# Patient Record
Sex: MALE | Race: BLACK OR AFRICAN AMERICAN | NOT HISPANIC OR LATINO | Employment: FULL TIME | URBAN - METROPOLITAN AREA
[De-identification: names, ages, dates, MRNs, and addresses within clinical notes are randomized per-mention and may not be internally consistent; named-entity substitution may affect disease eponyms.]

---

## 2017-01-12 ENCOUNTER — TELEPHONE (OUTPATIENT)
Dept: GASTROENTEROLOGY | Facility: CLINIC | Age: 52
End: 2017-01-12

## 2017-01-12 NOTE — TELEPHONE ENCOUNTER
Per office visit patient needs to have Colonoscopy , pt stated to give his wife a call have left several message for her to give the office a call in regards to scheduling her  procedure.

## 2017-03-21 ENCOUNTER — TELEPHONE (OUTPATIENT)
Dept: GASTROENTEROLOGY | Facility: CLINIC | Age: 52
End: 2017-03-21

## 2017-03-21 NOTE — TELEPHONE ENCOUNTER
Patient stopped by the office and was ready to scheduled Colonoscopy, pt was seen in the office on 12/13/2016 by . Patient is scheduled for Colonoscopy on 4/7/17 at [facility] information was given and explained.

## 2017-03-30 ENCOUNTER — TELEPHONE (OUTPATIENT)
Dept: GASTROENTEROLOGY | Facility: CLINIC | Age: 52
End: 2017-03-30

## 2017-03-30 DIAGNOSIS — Z86.010 HISTORY OF ADENOMATOUS POLYP OF COLON: Primary | ICD-10-CM

## 2017-03-30 NOTE — TELEPHONE ENCOUNTER
Patient was rescheduled for Colonoscopy at [facility] with  on 4/10/17 per pt's request. Old records from last Colonoscopy with  was scan in under media.

## 2017-04-10 ENCOUNTER — HOSPITAL ENCOUNTER (OUTPATIENT)
Facility: HOSPITAL | Age: 52
Discharge: HOME | End: 2017-04-10
Attending: INTERNAL MEDICINE | Admitting: INTERNAL MEDICINE
Payer: COMMERCIAL

## 2017-04-10 ENCOUNTER — SURGERY (OUTPATIENT)
Age: 52
End: 2017-04-10

## 2017-04-10 ENCOUNTER — ANESTHESIA (OUTPATIENT)
Dept: ENDOSCOPY | Facility: HOSPITAL | Age: 52
End: 2017-04-10
Payer: COMMERCIAL

## 2017-04-10 ENCOUNTER — ANESTHESIA EVENT (OUTPATIENT)
Dept: ENDOSCOPY | Facility: HOSPITAL | Age: 52
End: 2017-04-10
Payer: COMMERCIAL

## 2017-04-10 DIAGNOSIS — Z86.010 HISTORY OF ADENOMATOUS POLYP OF COLON: Primary | ICD-10-CM

## 2017-04-10 DIAGNOSIS — D12.2 ADENOMATOUS POLYP OF ASCENDING COLON: ICD-10-CM

## 2017-04-10 PROBLEM — Z86.0101 HISTORY OF ADENOMATOUS POLYP OF COLON: Status: ACTIVE | Noted: 2017-04-10

## 2017-04-10 PROCEDURE — 45385 COLONOSCOPY W/LESION REMOVAL: CPT | Performed by: INTERNAL MEDICINE

## 2017-04-10 PROCEDURE — 63600175 PHARM REV CODE 636 W HCPCS: Performed by: NURSE ANESTHETIST, CERTIFIED REGISTERED

## 2017-04-10 PROCEDURE — 37000009 HC ANESTHESIA EA ADD 15 MINS: Performed by: INTERNAL MEDICINE

## 2017-04-10 PROCEDURE — 25000003 PHARM REV CODE 250: Performed by: INTERNAL MEDICINE

## 2017-04-10 PROCEDURE — 88305 TISSUE EXAM BY PATHOLOGIST: CPT | Performed by: PATHOLOGY

## 2017-04-10 PROCEDURE — 25000003 PHARM REV CODE 250: Performed by: NURSE ANESTHETIST, CERTIFIED REGISTERED

## 2017-04-10 PROCEDURE — 27201028 HC NEEDLE, SCLERO: Performed by: INTERNAL MEDICINE

## 2017-04-10 PROCEDURE — 27200997: Performed by: INTERNAL MEDICINE

## 2017-04-10 PROCEDURE — 37000008 HC ANESTHESIA 1ST 15 MINUTES: Performed by: INTERNAL MEDICINE

## 2017-04-10 PROCEDURE — 88305 TISSUE EXAM BY PATHOLOGIST: CPT | Mod: 26,,, | Performed by: PATHOLOGY

## 2017-04-10 PROCEDURE — 45390 COLONOSCOPY W/RESECTION: CPT | Performed by: INTERNAL MEDICINE

## 2017-04-10 PROCEDURE — 45381 COLONOSCOPY SUBMUCOUS NJX: CPT | Performed by: INTERNAL MEDICINE

## 2017-04-10 PROCEDURE — 45381 COLONOSCOPY SUBMUCOUS NJX: CPT | Mod: ,,, | Performed by: INTERNAL MEDICINE

## 2017-04-10 PROCEDURE — 27201089 HC SNARE, DISP (ANY): Performed by: INTERNAL MEDICINE

## 2017-04-10 PROCEDURE — 45385 COLONOSCOPY W/LESION REMOVAL: CPT | Mod: 33,,, | Performed by: INTERNAL MEDICINE

## 2017-04-10 RX ORDER — MIDAZOLAM HYDROCHLORIDE 1 MG/ML
INJECTION, SOLUTION INTRAMUSCULAR; INTRAVENOUS
Status: DISCONTINUED | OUTPATIENT
Start: 2017-04-10 | End: 2017-04-10

## 2017-04-10 RX ORDER — SODIUM CHLORIDE 9 MG/ML
INJECTION, SOLUTION INTRAVENOUS CONTINUOUS
Status: DISCONTINUED | OUTPATIENT
Start: 2017-04-10 | End: 2017-04-10 | Stop reason: HOSPADM

## 2017-04-10 RX ORDER — PROPOFOL 10 MG/ML
VIAL (ML) INTRAVENOUS CONTINUOUS PRN
Status: DISCONTINUED | OUTPATIENT
Start: 2017-04-10 | End: 2017-04-10

## 2017-04-10 RX ORDER — LIDOCAINE HCL/PF 100 MG/5ML
SYRINGE (ML) INTRAVENOUS
Status: DISCONTINUED | OUTPATIENT
Start: 2017-04-10 | End: 2017-04-10

## 2017-04-10 RX ORDER — PROPOFOL 10 MG/ML
VIAL (ML) INTRAVENOUS
Status: DISCONTINUED | OUTPATIENT
Start: 2017-04-10 | End: 2017-04-10

## 2017-04-10 RX ADMIN — SODIUM CHLORIDE: 0.9 INJECTION, SOLUTION INTRAVENOUS at 01:04

## 2017-04-10 RX ADMIN — LIDOCAINE HYDROCHLORIDE 75 MG: 20 INJECTION, SOLUTION INTRAVENOUS at 03:04

## 2017-04-10 RX ADMIN — PROPOFOL 70 MG: 10 INJECTION, EMULSION INTRAVENOUS at 03:04

## 2017-04-10 RX ADMIN — PROPOFOL 150 MCG/KG/MIN: 10 INJECTION, EMULSION INTRAVENOUS at 03:04

## 2017-04-10 RX ADMIN — MIDAZOLAM 2 MG: 1 INJECTION INTRAMUSCULAR; INTRAVENOUS at 03:04

## 2017-04-10 RX ADMIN — SODIUM CHLORIDE: 0.9 INJECTION, SOLUTION INTRAVENOUS at 03:04

## 2017-04-10 NOTE — H&P
Short Stay Endoscopy History and Physical  [facility] [place] [facility] [place]    PCP: Lottie Figueroa MD   Referring MD: Same    Procedure - colonoscopy  ASA - per anesthesia  History of Anesthesia problems - no  Family history Anesthesia problems -  no   Plan of anesthesia - MAC    HPI:  This is a 51 y.o. male here for evaluation of : a family history (brother) of colon cancer <49yo and personal history of adenomatous polyps removed at colonoscopy of July 2011. NO new or worsening GI complaints.    Reflux - no  Dysphagia - no  Abdominal pain - no  Diarrhea - no    ROS:  Constitutional: No fevers, chills, No weight loss  CV: No chest pain  Pulm: No cough, No shortness of breath  Ophtho: No vision changes  GI: see HPI  Derm: No rash    Medical History:  has no past medical history on file.    Surgical History:  has a past surgical history that includes Colonoscopy and Appendectomy.    Family History: family history includes Colon cancer in his brother.. Otherwise no colon cancer, inflammatory bowel disease, or GI malignancies.    Social History:  reports that he has never smoked. He does not have any smokeless tobacco history on file. He reports that he drinks alcohol. He reports that he does not use illicit drugs.    Review of patient's allergies indicates:  No Known Allergies    Medications:   No prescriptions prior to admission.       Physical Exam:    Vital Signs:   Vitals:    04/10/17 1318   BP: (!) 145/92   Pulse: 84   Resp: 16   Temp: 97.6 °F (36.4 °C)       General Appearance: Well appearing in no acute distress  Eyes:    No scleral icterus  ENT: Neck supple, Lips, mucosa, and tongue normal; teeth and gums normal  Lungs: CTA anteriorly  Heart:  Regular rate, S1, S2 normal, no murmurs heard.  Abdomen: Mild to moderately obese, soft, non tender, non distended with normal bowel sounds. No hepatosplenomegaly, ascites, or mass.s  Extremities: No edema  Skin: No rash    Labs:  No results found for: CBC or  CMP    Plan:  Will proceed with planned colonoscopy. I have explained the risks and benefits of endoscopy procedures to the patient including but not limited to bleeding, perforation, infection, and death.        Joseph Savage MD, FACP, FACG, AGAF [facility]  [address], [address], [place] [address]  Phone: [phone] Fax: [phone] [address], [address], [place] [address]  Phone: [phone] Fax: [phone]

## 2017-04-10 NOTE — TRANSFER OF CARE
"Anesthesia Transfer of Care Note    Patient: Candelario Amaral    Procedure(s) Performed: Procedure(s) (LRB):  COLONOSCOPY (N/A)    Patient location: GI    Anesthesia Type: MAC    Transport from OR: Transported from OR on room air with adequate spontaneous ventilation    Post pain: adequate analgesia    Post assessment: no apparent anesthetic complications    Post vital signs: stable    Level of consciousness: awake    Nausea/Vomiting: no nausea/vomiting    Complications: none          Last vitals:   Visit Vitals    BP (!) 145/92 (BP Location: Left arm, Patient Position: Lying, BP Method: Automatic)    Pulse 84    Temp 36.4 °C (97.6 °F) (Oral)    Resp 16    Ht 5' 8" (1.727 m)    Wt 104.3 kg (230 lb)    SpO2 98%    BMI 34.97 kg/m2     "

## 2017-04-10 NOTE — PLAN OF CARE
Dc instructions reviewed with patient, verbalized understanding  Dr. Savage spoke with patient at bedside regarding procedural results

## 2017-04-10 NOTE — ANESTHESIA POSTPROCEDURE EVALUATION
"Anesthesia Post Evaluation    Patient: Candelario Amaral    Procedure(s) Performed: Procedure(s) (LRB):  COLONOSCOPY (N/A)    Final Anesthesia Type: MAC  Patient location during evaluation: GI PACU  Patient participation: Yes- Able to Participate  Level of consciousness: awake and alert  Post-procedure vital signs: reviewed and stable  Pain management: adequate  Airway patency: patent  PONV status at discharge: No PONV  Anesthetic complications: no      Cardiovascular status: blood pressure returned to baseline and hemodynamically stable  Respiratory status: unassisted, spontaneous ventilation and room air  Hydration status: euvolemic  Follow-up not needed.        Visit Vitals    BP (!) 145/92 (BP Location: Left arm, Patient Position: Lying, BP Method: Automatic)    Pulse 84    Temp 36.4 °C (97.6 °F) (Oral)    Resp 16    Ht 5' 8" (1.727 m)    Wt 104.3 kg (230 lb)    SpO2 98%    BMI 34.97 kg/m2       Pain/Baldemar Score: Pain Assessment Performed: Yes (4/10/2017  1:21 PM)  Presence of Pain: denies (4/10/2017  1:21 PM)      "

## 2017-04-10 NOTE — DISCHARGE INSTRUCTIONS
Discharge Summary/Instructions for after Colonoscopy with Biopsy/Polypectomy    Candelario Amaral  4/10/2017  Joseph Degroot*    Restrictions on Activity:    - Do not drive car or operate machinery until the day after the procedure.  - The following day: return to full activity including work.  - For 3 days: No heavy lifting, straining or running.  - Diet: Eat and drink normally unless instructed otherwise.    Treatment for Common Side Effects:  - Mild abdominal pain and bloating or excessive gas: rest, eat lightly and use a heating pad.     Symptoms to watch for and report to your physician:  1. Severe abdominal pain.  2. Fever within 24 hours after a procedure.  3. A large amount of rectal bleeding. (A small amount of blood from the rectum is not serious, especially if hemorrhoids are present.)  4. Because air was put into your colon during the procedure, expelling large amount of air from your rectum is normal.  5. You may not have a bowel movement for 1-3 days because of the colonoscopy prep. This is normal.  6. Go directly to the emergency room if you notice any of the following:     Chills and/orfever over 101   Persistent vomiting   Severe abdominal pain, other than gas cramps   Severe chest pain   Black, tarry stools   Any bleeding - exceeding one tablespoon    If you have any questions or problems, please call your Physician:    Joseph Degroot*    Lab Results: [phone]    If a complication or emergency situation arises and you are unable to reach your Physician - GO TO THE EMERGENCY ROOM.

## 2017-04-10 NOTE — PLAN OF CARE
Patient's wife not available on phone.   Patient's wife located in surgery waiting room   Patient escorted out ambulatory without complains

## 2017-04-10 NOTE — IP AVS SNAPSHOT
[facility]  [address]  [place] [address]  Phone: [phone]           Patient Discharge Instructions   Our goal is to set you up for success. This packet includes information on your condition, medications, and your home care.  It will help you care for yourself to prevent having to return to the hospital.     Please ask your nurse if you have any questions.      There are many details to remember when preparing to leave the hospital. Here is what you will need to do:    1. Take your medicine. If you are prescribed medications, review your Medication List on the following pages. You may have new medications to  at the pharmacy and others that you'll need to stop taking. Review the instructions for how and when to take your medications. Talk with your doctor or nurses if you are unsure of what to do.     2. Go to your follow-up appointments. Specific follow-up information is listed in the following pages. Your may be contacted by a nurse or clinical provider about future appointments. Be sure we have all of the phone numbers to reach you. Please contact your provider's office if you are unable to make an appointment.     3. Watch for warning signs. Your doctor or nurse will give you detailed warning signs to watch for and when to call for assistance. These instructions may also include educational information about your condition. If you experience any of warning signs to your health, call your doctor.               ** Verify the list of medication(s) below is accurate and up to date. Carry this with you in case of emergency. If your medications have changed, please notify your healthcare provider.             Medication List      Notice     You have not been prescribed any medications.               Please bring to all follow up appointments:    1. A copy of your discharge instructions.  2. All medicines you are currently taking in their original bottles.  3. Identification and  insurance card.    Please arrive 15 minutes ahead of scheduled appointment time.    Please call 24 hours in advance if you must reschedule your appointment and/or time.        Follow-up Information     Follow up with Lottie Figueroa MD.    Specialty:  Family Medicine    Why:  As needed    Contact information:    [address] [place] [address] [phone]          Follow up with Joseph Savage MD.    Specialty:  Gastroenterology    Why:  As needed, Office will call with biopsy / pathology report    Contact information:    [address]  [address]  [place] [address] [phone]          Discharge Instructions     Future Orders    Diet general     Questions:    Total calories:      Fat restriction, if any:      Protein restriction, if any:      Na restriction, if any:      Fluid restriction:      Additional restrictions:          Discharge Instructions       Discharge Summary/Instructions for after Colonoscopy with Biopsy/Polypectomy    Candelario Amaral  4/10/2017  Joseph Degroot*    Restrictions on Activity:    - Do not drive car or operate machinery until the day after the procedure.  - The following day: return to full activity including work.  - For 3 days: No heavy lifting, straining or running.  - Diet: Eat and drink normally unless instructed otherwise.    Treatment for Common Side Effects:  - Mild abdominal pain and bloating or excessive gas: rest, eat lightly and use a heating pad.     Symptoms to watch for and report to your physician:  1. Severe abdominal pain.  2. Fever within 24 hours after a procedure.  3. A large amount of rectal bleeding. (A small amount of blood from the rectum is not serious, especially if hemorrhoids are present.)  4. Because air was put into your colon during the procedure, expelling large amount of air from your rectum is normal.  5. You may not have a bowel movement for 1-3 days because of the colonoscopy prep. This is normal.  6. Go  "directly to the emergency room if you notice any of the following:     Chills and/orfever over 101   Persistent vomiting   Severe abdominal pain, other than gas cramps   Severe chest pain   Black, tarry stools   Any bleeding - exceeding one tablespoon    If you have any questions or problems, please call your Physician:    Joseph Degroot*    Lab Results: [phone]    If a complication or emergency situation arises and you are unable to reach your Physician - GO TO THE EMERGENCY ROOM.          Primary Diagnosis     Your primary diagnosis was:  History Of Adenomatous Polyp Of Colon      Admission Information     Date & Time Provider Department CSN    4/10/2017 12:50 PM Joseph Savage MD [facility] [id number]      Care Providers     Provider Role Specialty Primary office phone    Joseph Savage MD Attending Provider Gastroenterology [phone]    Joseph Savage MD Surgeon  Gastroenterology [phone]      Your Vitals Were     BP Pulse Temp Resp Height Weight    118/82 (BP Location: Right arm, Patient Position: Lying) 72 97.6 °F (36.4 °C) (Oral) 16 5' 8" (1.727 m) 104.3 kg (230 lb)    SpO2 BMI             99% 34.97 kg/m2         Recent Lab Values     No lab values to display.      Pending Labs     Order Current Status    Specimen to Pathology - Surgery Collected (04/10/17 1613)      Allergies as of 4/10/2017     No Known Allergies      [facility]     [facility] Nurse Care Line - 24/7 Assistance  Unless otherwise directed by your provider, please contact [facility], our nurse care line that is available for 24/7 assistance.     Registered nurses in the [facility] provide clinical advisement, health education, appointment booking, and other advisory services.  Call for this free service at [phone].        Advance Directives     An advance directive is a document which, in the event you are no longer able to make " decisions for yourself, tells your healthcare team what kind of treatment you do or do not want to receive, or who you would like to make those decisions for you.  If you do not currently have an advance directive, [facility] encourages you to create one.  For more information call:  [phone]), [phone] ([phone]),  or log on to [email or web address].        Language Assistance Services     ATTENTION: Language assistance services are available, free of charge. Please call [phone].      ATENCIÓN: Si habla español, tiene a moran disposición servicios gratuitos de asistencia lingüística. Llame al [phone].     CHÚ Ý: N?u b?n nói Ti?ng Vi?t, có các d?ch v? h? tr? ngôn ng? mi?n phí dành cho b?n. G?i s? [phone].        [facility] Sign-Up     Activating your [facility] account is as easy as 1-2-3!     1) Visit [email or web address], select Sign Up Now, enter this activation code and your date of birth, then select Next.  [email or web address]  Expires: 5/25/2017  4:25 PM      2) Create a username and password to use when you visit [facility] in the future and select a security question in case you lose your password and select Next.    3) Enter your e-mail address and click Sign Up!    Additional Information  If you have questions, please e-mail [email or web address] or call [phone] to talk to our [facility] staff. Remember, [facility] is NOT to be used for urgent needs. For medical emergencies, dial 911.          [facility] complies with applicable Federal civil rights laws and does not discriminate on the basis of race, color, national origin, age, disability, or sex.

## 2017-04-10 NOTE — ANESTHESIA PREPROCEDURE EVALUATION
04/10/2017  Candelario Amaral is a 51 y.o., male for colonoscopy    Review of patient's allergies indicates:  No Known Allergies    No past medical history on file.    Past Surgical History:   Procedure Laterality Date    COLONOSCOPY       There is no problem list on file for this patient.        [facility] Anesthesia Evaluation     I have reviewed the Nursing Notes.   I have reviewed the Medications.     Review of Systems  Hepatic/GI:   Bowel Prep.      Wt Readings from Last 3 Encounters:   12/13/16 107.5 kg (237 lb)     Temp Readings from Last 3 Encounters:   No data found for Temp     BP Readings from Last 3 Encounters:   12/13/16 (!) 140/97     Pulse Readings from Last 3 Encounters:   12/13/16 81     No results found for: WBC, HGB, HCT, MCV, PLT      Physical Exam  General:  Well nourished    Airway/Jaw/Neck:  Airway Findings: Mouth Opening: Normal Tongue: Normal  General Airway Assessment: Adult  Mallampati: II  Improves to II with phonation.  TM Distance: Normal, at least 6 cm       Chest/Lungs:  Chest/Lungs Clear    Heart/Vascular:  Heart Findings: Normal       Mental Status:  Mental Status Findings:  Cooperative         Anesthesia Plan  Type of Anesthesia, risks & benefits discussed:  Anesthesia Type:  MAC  Patient's Preference:   Intra-op Monitoring Plan:   Intra-op Monitoring Plan Comments:   Post Op Pain Control Plan:   Post Op Pain Control Plan Comments:   Induction:   IV  Beta Blocker:         Informed Consent: Patient understands risks and agrees with Anesthesia plan.  Questions answered. Anesthesia consent signed with patient.  ASA Score: 1     Day of Surgery Review of History & Physical: I have interviewed and examined the patient. I have reviewed the patient's H&P dated:  There are no significant changes.   H&P completed by Anesthesiologist.       Ready For Surgery From Anesthesia Perspective.

## 2017-04-12 VITALS
WEIGHT: 230 LBS | TEMPERATURE: 98 F | HEIGHT: 68 IN | RESPIRATION RATE: 17 BRPM | DIASTOLIC BLOOD PRESSURE: 85 MMHG | BODY MASS INDEX: 34.86 KG/M2 | OXYGEN SATURATION: 100 % | SYSTOLIC BLOOD PRESSURE: 155 MMHG | HEART RATE: 65 BPM

## 2017-04-18 ENCOUNTER — TELEPHONE (OUTPATIENT)
Dept: GASTROENTEROLOGY | Facility: CLINIC | Age: 52
End: 2017-04-18

## 2017-04-19 ENCOUNTER — TELEPHONE (OUTPATIENT)
Dept: GASTROENTEROLOGY | Facility: CLINIC | Age: 52
End: 2017-04-19

## 2017-04-19 NOTE — TELEPHONE ENCOUNTER
----- Message from Joseph Savage MD sent at 4/18/2017  2:02 PM CDT -----  Review of pathology report from colonoscopy dated 10 April 17:  SPECIMEN  1) Polyp at 35 cm by hot snare.  2) Cecal polyp by hot snare.  FINAL PATHOLOGIC DIAGNOSIS  1. 35 cm polyp:  -Benign polyploid colonic mucosa  -Small lymphoid aggregate  -Negative for dysplasia or malignancy  2. Cecal polyp:  -Adenomatous polyp  -See note  Note: 2. Cross sections of adenoma are represented. High-grade dysplasia is not identified.    IMP:- benign colon polyps removed    REC:- F/U colonoscopy 3yr    PCP: MD Joseph Brock MD, FACP, FACG, AGAF [facility]  [address], [address], [place] [address]  Phone: [phone] Fax: [phone]    [address], [address], [place] [address]  Phone: [phone] Fax: [phone]    - Portions of this note were dictated using voice recognition software and may contain dictation related errors in spelling / grammar / syntax not discovered on text review.

## 2017-11-18 ENCOUNTER — HOSPITAL ENCOUNTER (EMERGENCY)
Facility: HOSPITAL | Age: 52
Discharge: HOME | End: 2017-11-18
Attending: FAMILY MEDICINE
Payer: OTHER MISCELLANEOUS

## 2017-11-18 VITALS
RESPIRATION RATE: 97 BRPM | DIASTOLIC BLOOD PRESSURE: 82 MMHG | HEART RATE: 60 BPM | WEIGHT: 245 LBS | SYSTOLIC BLOOD PRESSURE: 118 MMHG | BODY MASS INDEX: 37.13 KG/M2 | OXYGEN SATURATION: 98 % | HEIGHT: 68 IN | TEMPERATURE: 97 F

## 2017-11-18 DIAGNOSIS — M54.2 NECK PAIN: Primary | ICD-10-CM

## 2017-11-18 PROCEDURE — 96372 THER/PROPH/DIAG INJ SC/IM: CPT

## 2017-11-18 PROCEDURE — 99283 EMERGENCY DEPT VISIT LOW MDM: CPT | Mod: 25

## 2017-11-18 PROCEDURE — 63600175 PHARM REV CODE 636 W HCPCS: Performed by: FAMILY MEDICINE

## 2017-11-18 RX ORDER — DICLOFENAC SODIUM 50 MG/1
50 TABLET, DELAYED RELEASE ORAL 2 TIMES DAILY
Qty: 21 TABLET | Refills: 0 | Status: SHIPPED | OUTPATIENT
Start: 2017-11-18

## 2017-11-18 RX ORDER — KETOROLAC TROMETHAMINE 30 MG/ML
60 INJECTION, SOLUTION INTRAMUSCULAR; INTRAVENOUS
Status: COMPLETED | OUTPATIENT
Start: 2017-11-18 | End: 2017-11-18

## 2017-11-18 RX ORDER — CYCLOBENZAPRINE HCL 10 MG
10 TABLET ORAL 3 TIMES DAILY PRN
Qty: 15 TABLET | Refills: 0 | Status: SHIPPED | OUTPATIENT
Start: 2017-11-18 | End: 2017-11-23

## 2017-11-18 RX ADMIN — KETOROLAC TROMETHAMINE 60 MG: 60 INJECTION, SOLUTION INTRAMUSCULAR at 11:11
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
## 2021-03-04 ENCOUNTER — IMMUNIZATION (OUTPATIENT)
Dept: FAMILY MEDICINE | Facility: CLINIC | Age: 56
End: 2021-03-04
Payer: COMMERCIAL

## 2021-03-04 DIAGNOSIS — Z23 NEED FOR VACCINATION: Primary | ICD-10-CM

## 2021-03-04 PROCEDURE — 91300 COVID-19, MRNA, LNP-S, PF, 30 MCG/0.3 ML DOSE VACCINE: CPT | Mod: PBBFAC | Performed by: FAMILY MEDICINE

## 2021-03-31 ENCOUNTER — IMMUNIZATION (OUTPATIENT)
Dept: FAMILY MEDICINE | Facility: CLINIC | Age: 56
End: 2021-03-31
Payer: COMMERCIAL

## 2021-03-31 DIAGNOSIS — Z23 NEED FOR VACCINATION: Primary | ICD-10-CM

## 2021-03-31 PROCEDURE — 0002A COVID-19, MRNA, LNP-S, PF, 30 MCG/0.3 ML DOSE VACCINE: CPT | Mod: PBBFAC | Performed by: FAMILY MEDICINE

## 2021-03-31 PROCEDURE — 91300 COVID-19, MRNA, LNP-S, PF, 30 MCG/0.3 ML DOSE VACCINE: CPT | Mod: PBBFAC | Performed by: FAMILY MEDICINE

## 2022-08-18 ENCOUNTER — IMMUNIZATION (OUTPATIENT)
Dept: INTERNAL MEDICINE | Facility: CLINIC | Age: 57
End: 2022-08-18
Payer: COMMERCIAL

## 2022-08-18 DIAGNOSIS — Z23 NEED FOR VACCINATION: Primary | ICD-10-CM

## 2022-08-18 PROCEDURE — 0051A COVID-19, MRNA, LNP-S, PF, 30 MCG/0.3 ML DOSE VACCINE (PFIZER): CPT | Mod: PBBFAC | Performed by: FAMILY MEDICINE

## 2022-08-18 PROCEDURE — 91305 COVID-19, MRNA, LNP-S, PF, 30 MCG/0.3 ML DOSE VACCINE (PFIZER): CPT | Mod: PBBFAC | Performed by: FAMILY MEDICINE

## 2023-11-29 ENCOUNTER — HOSPITAL ENCOUNTER (EMERGENCY)
Facility: HOSPITAL | Age: 58
Discharge: HOME | End: 2023-11-29
Attending: STUDENT IN AN ORGANIZED HEALTH CARE EDUCATION/TRAINING PROGRAM
Payer: COMMERCIAL

## 2023-11-29 VITALS
RESPIRATION RATE: 20 BRPM | HEART RATE: 99 BPM | WEIGHT: 225 LBS | DIASTOLIC BLOOD PRESSURE: 100 MMHG | SYSTOLIC BLOOD PRESSURE: 182 MMHG | OXYGEN SATURATION: 95 % | BODY MASS INDEX: 33.33 KG/M2 | TEMPERATURE: 99 F | HEIGHT: 69 IN

## 2023-11-29 DIAGNOSIS — J10.1 INFLUENZA A: Primary | ICD-10-CM

## 2023-11-29 LAB
GROUP A STREP, MOLECULAR: NEGATIVE
INFLUENZA A, MOLECULAR: POSITIVE
INFLUENZA B, MOLECULAR: NEGATIVE
SARS-COV-2 RDRP RESP QL NAA+PROBE: NEGATIVE
SPECIMEN SOURCE: ABNORMAL

## 2023-11-29 PROCEDURE — 87502 INFLUENZA DNA AMP PROBE: CPT | Mod: ER

## 2023-11-29 PROCEDURE — 99282 EMERGENCY DEPT VISIT SF MDM: CPT | Mod: ER

## 2023-11-29 PROCEDURE — U0002 COVID-19 LAB TEST NON-CDC: HCPCS | Mod: ER

## 2023-11-29 PROCEDURE — 87651 STREP A DNA AMP PROBE: CPT | Mod: ER

## 2023-11-29 NOTE — Clinical Note
"Candelario Samuelsan" [place] was seen and treated in our emergency department on 11/29/2023.  He may return to work on 12/11/2023.       If you have any questions or concerns, please don't hesitate to call.      Katie Allen PA-C"

## 2023-11-29 NOTE — DISCHARGE INSTRUCTIONS
Below are suggestions for symptomatic relief:              - Tylenol every 4 hours OR ibuprofen every 6 hours as needed for pain/fever.              - Salt water gargles to soothe throat pain.              - Chloroseptic spray also helps to numb throat pain.              - Nasal saline spray reduces inflammation and dryness.              - Warm face compresses to help with facial sinus pain/pressure.              - Jeff's vapor rub at night.              - Flonase OTC or Nasocort OTC for nasal congestion.              - Simple foods like bread, rice, soup.              - Delsym helps with coughing at night              - Zyrtec/Claritin during the day & Benadryl at night may help with allergies.     If you DO HAVE hypertension or palpitations, it is safe to take Coricidin HBP for relief of sinus symptoms.     If you DO NOT HAVE hypertension or any history of palpitations, it is ok to take over the counter Sudafed or Mucinex D or Allegra-D or Claritin-D or Zyrtec-D.  If you do take one of the above, it is ok to combine that with plain over the counter Mucinex or Allegra or Claritin or Zyrtec.   If, for example, you are taking Zyrtec-D, you can combine that with Mucinex, but not Mucinex-D.  If you are taking Mucinex-D, you can combine that with plain Allegra or Claritin or Zyrtec.      Follow up with your primary care provider within 2-5 days if your symptoms have not improved.

## 2023-11-29 NOTE — ED PROVIDER NOTES
Encounter Date: 11/29/2023       History     Chief Complaint   Patient presents with    COVID-19 Concerns     Nasal congestion, sore throat, cough and body aches for the past 2 days.   Patient reports he received a flu vaccine injection 1 hour ago at [facility].      57 yo male presents to the ED with cough, nasal congestion, body aches. Symptoms started three days ago.  Denies any chest pain, shortness of breath, abdominal pain, nausea, vomiting, diarrhea.  He has been taking an over-the-counter cough syrup combination medication, a.m. and p.m..  This does help his symptoms.  He has no diagnosed medical conditions and takes no daily medications.  Never been diagnosed with high blood pressure before, encouraged to follow up with PCP. He denies headache, vision changes, paresthesias, slurred speech, extremity weakness.     The history is provided by the patient.     Review of patient's allergies indicates:  No Known Allergies  No past medical history on file.  Past Surgical History:   Procedure Laterality Date    APPENDECTOMY      COLONOSCOPY      COLONOSCOPY N/A 4/10/2017    Procedure: COLONOSCOPY;  Surgeon: Joseph Savage MD;  Location: [facility];  Service: Endoscopy;  Laterality: N/A;     Family History   Problem Relation Age of Onset    Colon cancer Brother      Social History     Substance Use Topics    Alcohol use: Yes    Drug use: No     Review of Systems   Constitutional:  Positive for chills, fatigue and fever.   HENT:  Positive for congestion and sore throat. Negative for facial swelling.    Respiratory:  Positive for cough. Negative for shortness of breath.    Cardiovascular:  Negative for chest pain.   Gastrointestinal:  Negative for abdominal pain, nausea and vomiting.   Neurological:  Negative for headaches.   Psychiatric/Behavioral:  Negative for agitation and confusion.        Physical Exam     Initial Vitals [11/29/23 1533]   BP Pulse Resp Temp SpO2   (!) 186/96 99 20 98.6 °F (37 °C) 96 %       MAP       --         Physical Exam    Nursing note and vitals reviewed.  Constitutional: He appears well-developed and well-nourished. He is not diaphoretic.  Non-toxic appearance. No distress.   HENT:   Head: Normocephalic and atraumatic.   Right Ear: Hearing, tympanic membrane, external ear and ear canal normal.   Left Ear: Hearing, tympanic membrane, external ear and ear canal normal.   Nose: Mucosal edema present.   Mouth/Throat: Uvula is midline. No trismus in the jaw. No uvula swelling. Posterior oropharyngeal erythema present. No oropharyngeal exudate or posterior oropharyngeal edema.   Eyes: Conjunctivae and EOM are normal. Pupils are equal, round, and reactive to light.   Neck: Neck supple.   Normal range of motion.  Cardiovascular:  Normal rate and regular rhythm.           Pulmonary/Chest: Breath sounds normal. No respiratory distress. He has no wheezes. He has no rales.   Abdominal: Abdomen is soft. He exhibits no distension. There is no abdominal tenderness.   Musculoskeletal:         General: Normal range of motion.      Cervical back: Normal range of motion and neck supple.     Neurological: He is alert and oriented to person, place, and time. GCS score is 15. GCS eye subscore is 4. GCS verbal subscore is 5. GCS motor subscore is 6.   Skin: Skin is warm and dry.   Psychiatric: He has a normal mood and affect. His behavior is normal. Judgment and thought content normal.         ED Course   Procedures  Labs Reviewed   INFLUENZA A & B BY MOLECULAR - Abnormal; Notable for the following components:       Result Value    Influenza A, Molecular Positive (*)     All other components within normal limits   GROUP A STREP, MOLECULAR   SARS-COV-2 RNA AMPLIFICATION, QUAL    Narrative:     Is the patient symptomatic?->Yes          Imaging Results    None          Medications - No data to display  Medical Decision Making  58-year-old male with cough, nasal congestion, body aches for 3 days.  He is nontoxic in  appearance, afebrile on arrival.    Viral URI, COVID, Flu, allergic rhinitis, strep throat, viral pharyngitis, alejandra foreign body, otitis media/external, nasal polyp, bacterial sinusitis,  Considered but less likely: pneumonia, sepsis, meningitis, cavernous sinus thrombosis, peritonsillar abscess, retropharyngeal abscess, epiglottitis                 ED Course as of 11/30/23 0031   Wed Nov 29, 2023   1733 Patient appears fatigued, but generally well.  Nontoxic.  No distress.  Heart and lungs clear.  Abdomen soft and nontender.  COVID, strep negative.  He is not chronically ill or immunosuppressed.  Symptom onset > 48 hours, I will not prescribe oseltamivir. Additionally discussed supportive care.  Work note provided.  Given history and exam, lower suspicion for pneumonia, sepsis, COPD, CHF, ACS, bacterial otitis media, peritonsillar abscess, retropharyngeal abscess, meningitis. Stable for discharge at this time.  ED return precautions were discussed with patient.  PCP follow up recommended. [CS]      ED Course User Index  [CS] Katie Allen PA-C                           Clinical Impression:  Final diagnoses:  [J10.1] Influenza A (Primary)          ED Disposition Condition    Discharge Stable          ED Prescriptions    None       Follow-up Information       Follow up With Specialties Details Why Contact Info    Lottie Figueroa MD Family Medicine  As needed [address] [facility]   [address]  [place] [address] [phone]               Katie Allen PA-C  11/30/23 0031

## 2024-08-15 ENCOUNTER — HOSPITAL ENCOUNTER (EMERGENCY)
Facility: HOSPITAL | Age: 59
Discharge: HOME | End: 2024-08-15
Attending: EMERGENCY MEDICINE
Payer: COMMERCIAL

## 2024-08-15 VITALS
SYSTOLIC BLOOD PRESSURE: 127 MMHG | RESPIRATION RATE: 16 BRPM | DIASTOLIC BLOOD PRESSURE: 78 MMHG | HEART RATE: 82 BPM | BODY MASS INDEX: 33.33 KG/M2 | HEIGHT: 69 IN | TEMPERATURE: 98 F | WEIGHT: 225 LBS | OXYGEN SATURATION: 96 %

## 2024-08-15 DIAGNOSIS — H02.401 PTOSIS OF RIGHT EYELID: Primary | ICD-10-CM

## 2024-08-15 PROCEDURE — 25000003 PHARM REV CODE 250

## 2024-08-15 PROCEDURE — 99284 EMERGENCY DEPT VISIT MOD MDM: CPT | Mod: 25

## 2024-08-15 PROCEDURE — 63600175 PHARM REV CODE 636 W HCPCS

## 2024-08-15 RX ORDER — PREDNISONE 20 MG/1
40 TABLET ORAL DAILY
Qty: 8 TABLET | Refills: 0 | Status: SHIPPED | OUTPATIENT
Start: 2024-08-15 | End: 2024-08-19

## 2024-08-15 RX ORDER — PROPARACAINE HYDROCHLORIDE 5 MG/ML
1 SOLUTION/ DROPS OPHTHALMIC
Status: COMPLETED | OUTPATIENT
Start: 2024-08-15 | End: 2024-08-15

## 2024-08-15 RX ORDER — PREDNISONE 20 MG/1
60 TABLET ORAL
Status: COMPLETED | OUTPATIENT
Start: 2024-08-15 | End: 2024-08-15

## 2024-08-15 RX ADMIN — PREDNISONE 60 MG: 20 TABLET ORAL at 09:08

## 2024-08-15 RX ADMIN — PROPARACAINE HYDROCHLORIDE 1 DROP: 5 SOLUTION/ DROPS OPHTHALMIC at 08:08

## 2024-08-16 DIAGNOSIS — H49.01 THIRD NERVE PALSY OF RIGHT EYE: Primary | ICD-10-CM

## 2024-08-16 NOTE — ED NOTES
Patient arrived to ED via private vehicle with complaints of R eye blurry vision and R eye droop x 1 week. Reports symptoms started after a road trip from the [facility] back to [place]. Patient reports he saw an optometrist earlier today and was instructed to come to ER for further evaluation and work up. Reports optometrist was suggesting a scan of brain.   Patient awake, alert, oriented x 4. No vision changes to L eye. Denies dizziness. Speech clear. Ambulatory steady gait. Denies dizziness. No neuromuscluar deficits. Denies pain. Breathing unlabored and even.     APPEARANCE: Alert, oriented and in no acute distress.  CARDIAC: Normal rate and rhythm, no murmur heard.   PERIPHERAL VASCULAR: peripheral pulses present. Normal cap refill. No edema. Warm to touch.    RESPIRATORY:Normal rate and effort, breath sounds clear bilaterally throughout chest. Respirations are equal and unlabored no obvious signs of distress.  GASTRO: soft, bowel sounds normal, no tenderness, no abdominal distention.  MUSC: Full ROM. No bony tenderness or soft tissue tenderness. No obvious deformity.  SKIN: Skin is warm and dry, normal skin turgor, mucous membranes moist.  NEURO: 5/5 strength major flexors/extensors bilaterally. Sensory intact to light touch bilaterally. Dillan coma scale: eyes open spontaneously-4, oriented & converses-5, obeys commands-6. No neurological abnormalities.   MENTAL STATUS: awake, alert and aware of environment.  EYE: PERRL, both eyes: pupils brisk and reactive to light. Normal size. +2. R eye droop, swelling. No drainage, no pain.   ENT: EARS: no obvious drainage. NOSE: no active bleeding.

## 2024-08-16 NOTE — ED PROVIDER NOTES
Encounter Date: 8/15/2024       History     Chief Complaint   Patient presents with    Eye Problem     Pt arrived to the ED with complaints of blurred vision and eye drooping with an onset of a week ago. Pt states he went to the eye doctor this afternoon for blurred vision and eye drooping and the doctor suggested he come to the ER. Denies any drainage or pain. Denies use of glasses and contacts.     59 year old male with past medical history of adenomatous polyp of colon presents to the ED for further evaluation of worsening eye drooping x 1 week.  Patient notes symptoms begun 3 weeks ago.  States associated intermittent blurred vision and right eye drooping.  Patient denies photophobia, eye discharge, redness, itching, new onset of rashes, facial pain or swelling.  Denies loss of vision or recent eye trauma.  No pain with ocular movements.  Denies foreign body sensation.  No history of eye surgery.  Patient states he was evaluated by an ophthalmologist today who recommended to get further evaluation in the ED.  patient does not use glasses or contacts.  No, nausea, vomiting dizziness, neuro deficits, neck pain, headache, fever, chest pain shortness of breath.  No other acute complaints today.          The history is provided by the patient.     Review of patient's allergies indicates:  No Known Allergies  History reviewed. No pertinent past medical history.  Past Surgical History:   Procedure Laterality Date    APPENDECTOMY      COLONOSCOPY      COLONOSCOPY N/A 4/10/2017    Procedure: COLONOSCOPY;  Surgeon: Joseph Savage MD;  Location: [facility];  Service: Endoscopy;  Laterality: N/A;     Family History   Problem Relation Name Age of Onset    Colon cancer Brother       Social History     Substance Use Topics    Alcohol use: Yes    Drug use: No     Review of Systems   Constitutional:  Negative for chills and fever.   HENT:  Negative for facial swelling.    Eyes:  Positive for visual disturbance.  Negative for photophobia, pain, discharge and redness.   Respiratory:  Negative for shortness of breath.    Cardiovascular:  Negative for chest pain.   Gastrointestinal:  Negative for abdominal distention, abdominal pain, nausea and vomiting.   Musculoskeletal:  Negative for neck pain and neck stiffness.   Neurological:  Negative for dizziness, facial asymmetry, weakness, numbness and headaches.       Physical Exam     Initial Vitals [08/15/24 1949]   BP Pulse Resp Temp SpO2   117/86 97 16 98.2 °F (36.8 °C) 97 %      MAP       --         Physical Exam    Vitals reviewed.  Constitutional: He appears well-developed and well-nourished. He is not diaphoretic. No distress.   Pt alert and attentive, fluent speech. In no acute distress, resting comfortably on bed.   HENT:   Head: Normocephalic and atraumatic.   Right Ear: External ear normal.   Left Ear: External ear normal.   Mouth/Throat: Oropharynx is clear and moist.   Patient is able to move all muscles of his face. Theres slight right upper eyelid drooping. able to close right eye or raise right eyebrow. No pain with EOM on both eyes. Clear conjunctiva, no discharge.     IOP:    R:19; L15   Eyes: Conjunctivae and EOM are normal. Pupils are equal, round, and reactive to light. Right eye exhibits no discharge. Left eye exhibits no discharge.   Neck: Neck supple.   Normal range of motion.  Cardiovascular:  Normal rate and normal heart sounds.           Pulmonary/Chest: Breath sounds normal. No respiratory distress. He has no wheezes.   Abdominal: Abdomen is soft. Bowel sounds are normal. He exhibits no distension. There is no abdominal tenderness.   Musculoskeletal:         General: Normal range of motion.      Cervical back: Normal range of motion and neck supple.      Comments: Moves all extremities and carries on conversation. CN- II: PERRL; III/IV/VI: EOMI w/out evidence of nystagmus; V: no deficits appreciated to light touch bilateral face; VII: no facial weakness,  no facial asymmetry. Eyebrow raise symmetric. Smile symmetric; IX/X: palate midline, and raises symmetrically; XI: shoulder shrug 5/5 bilaterally; XII: tongue is midline w/out asymmetry. Strength 5/5 to bilateral upper and lower extremities, sensation intact to light touch,         Neurological: He is alert and oriented to person, place, and time. GCS score is 15. GCS eye subscore is 4. GCS verbal subscore is 5. GCS motor subscore is 6.   Moves all extremities and carries on conversation. CN- II: PERRL; III/IV/VI: EOMI w/out evidence of nystagmus; V: no deficits appreciated to light touch bilateral face; VII: no facial weakness, no facial asymmetry. Eyebrow raise symmetric. Smile symmetric; IX/X: palate midline, and raises symmetrically; XI: shoulder shrug 5/5 bilaterally; XII: tongue is midline w/out asymmetry. Strength 5/5 to bilateral upper and lower extremities, sensation intact to light touch,       Skin: Skin is warm. Capillary refill takes less than 2 seconds.   Psychiatric: He has a normal mood and affect. His behavior is normal. Judgment and thought content normal.         ED Course   Procedures  Labs Reviewed - No data to display       Imaging Results              CT Head Without Contrast (Final result)  Result time 08/15/24 21:35:51      Final result by Brandon Burger DO (08/15/24 21:35:51)                   Impression:      No acute intracranial abnormality.      Electronically signed by: Brandon Burger  Date:    08/15/2024  Time:    21:35               Narrative:    EXAMINATION:  CT HEAD WITHOUT CONTRAST    CLINICAL HISTORY:  Vision impairment (Ped 0-18y);    TECHNIQUE:  Low dose axial CT images obtained throughout the head without intravenous contrast. Sagittal and coronal reconstructions were performed.    COMPARISON:  None available.    FINDINGS:  Ventricles and sulci are normal in size for age without evidence of hydrocephalus. No extra-axial blood or fluid collections.  The brain parenchyma is  normal. No parenchymal mass, hemorrhage, edema or major vascular distribution infarct.    No calvarial fracture.  Incidentally noted persistence of the metopic suture.  The scalp is unremarkable.  Bilateral paranasal sinuses and mastoid air cells are clear.                                       Medications   proparacaine 0.5 % ophthalmic solution 1 drop (1 drop Left Eye Given by Provider 8/15/24 2030)   predniSONE tablet 60 mg (60 mg Oral Given 8/15/24 2157)     Medical Decision Making  Differential Diagnosis includes, but is not limited to:  Acute glaucoma, open globe, ocular foreign body, retinal detachment, vitreous hemorrhage, endophthalmitis, traumatic injury, orbital fracture, corneal abrasion/ulcer, retinal vascular occlusion, optic neuritis, periorbital/orbital cellulitis, hyphema, hypopion, iritis, UV keratitis, subconjunctival hemorrhage, conjunctivitis, blepharitis, chalazion/hordeolum, benign vitreous floaters, ptosis    ED management     59 year old male with past medical history of adenomatous polyp of colon presents to the ED for further evaluation of worsening eye drooping x 1 week.  Patient is not toxic appearing, hemodynamically stable and resting comfortably on bed. Patient is well-appearing.  Awake and alert.  Afebrile with vitals WNL. No distress on exam. No neurological symptoms. Specifically, he has had no headache, dysarthria, numbness/tingling, paralysis, or gait dysfunction. He has also not had any fevers/chills, cough, chest pain, rash, or new joint aches or muscle pains. Differential diagnosis is broad and includes but not limited to Bell's palsy, CVA,  nerve palsy of right eye versus other.  History and presentation NOT consistent with trigeminal neuralgia, Botulism, MG, ICH.The patient's eyebrows raise and symmetric manner bilaterally this is somewhat inconsistent with Bell's palsy and I felt a workup was indicated. CT head without evidence of acute intracranial abnormalities. Will send  home with Rx short course of Prednisone. Advised to have close follow up with PCP. Pt stable throughout ED course.    I have discussed the specifics of the workup with the patient and the patient has verbalized understanding of the details of the workup, the diagnosis, the treatment plan, and the need for outpatient follow-up with PCP. ED precautions given. Discussed with pt about returning to the ED, if symptoms fail to improve or worsen. Care of patient also discussed with Dr. Calero who agrees with assessment and plan.    RESULTS:  Documented in ED course.   Labs/ekg interpreted by myself and Dr. Calero.       Voice recognition software utilized in this note. Typographical and content errors may occur with this process. While efforts are made to detect and correct such errors, in some cases errors will persist. For this reason, wording in this document should be considered in the proper context and not strictly verbatim.                      Amount and/or Complexity of Data Reviewed  Radiology: ordered. Decision-making details documented in ED Course.    Risk  Prescription drug management.              Attending Attestation:     Physician Attestation Statement for NP/PA:   I personally made/approved the management plan and take responsibility for the patient management.    Other NP/PA Attestation Additions:    History of Present Illness: Agree; 59-year-old male sent to the emergency department from his ophthalmologist for evaluation of right eye drooping; he reports occasional double vision or blurry vision but this improves with blinking; this seems possibly more related to his right eye lid drooping over his pupil   Physical Exam: Agree   Medical Decision Making: Agree; CT head negative; exam and consistent with any sort of acute stroke; referred to primary care physician for further evaluation and management, given prescription for steroids             ED Course as of 08/16/24 1637   Thu Aug 15, 2024   2138  CT Head Without Contrast  FINDINGS:  Ventricles and sulci are normal in size for age without evidence of hydrocephalus. No extra-axial blood or fluid collections.  The brain parenchyma is normal. No parenchymal mass, hemorrhage, edema or major vascular distribution infarct.     No calvarial fracture.  Incidentally noted persistence of the metopic suture.  The scalp is unremarkable.  Bilateral paranasal sinuses and mastoid air cells are clear.     Impression:     No acute intracranial abnormality.   [NW]      ED Course User Index  [NW] Janna Flores PA-C                           Clinical Impression:  Final diagnoses:  [H02.401] Ptosis of right eyelid (Primary)          ED Disposition Condition    Discharge Stable          ED Prescriptions       Medication Sig Dispense Start Date End Date Auth. Provider    predniSONE (DELTASONE) 20 MG tablet Take 2 tablets (40 mg total) by mouth once daily. for 4 days 8 tablet 8/15/2024 8/19/2024 Janna Flores PA-C          Follow-up Information       Follow up With Specialties Details Why Contact Info    Lottie Figueroa MD Family Medicine Schedule an appointment as soon as possible for a visit in 3 days As needed, If symptoms worsen [address] [facility] [place] [address]  [phone]               Janna Flores PA-C  08/16/24 152       Leandro Calero MD  08/16/24 7056

## 2024-08-16 NOTE — DISCHARGE INSTRUCTIONS
Mr. Amaral,     Thank you for letting me care for you today! It was nice meeting you, and I hope you feel better soon.   If you would like access to your chart and what was done today please utilize the [facility].   Please don't hesitate to return if your symptoms worsen or you develop any other worrisome symptoms.    Our goal in the emergency department is to always give you outstanding care and exceptional service. You may receive a survey by mail or e-mail in the next week regarding your experience in our ED. We would greatly appreciate you completing and returning the survey. Your feedback provides us with a way to recognize our staff who give very good care and it helps us learn how to improve when your experience was below our aspiration of excellence.     Sincerely,    Janna Flores PA-C  Emergency Department Physician Assistant  [facility], [place], and [facility]

## 2024-09-03 ENCOUNTER — LAB VISIT (OUTPATIENT)
Dept: LAB | Facility: HOSPITAL | Age: 59
End: 2024-09-03
Attending: STUDENT IN AN ORGANIZED HEALTH CARE EDUCATION/TRAINING PROGRAM
Payer: COMMERCIAL

## 2024-09-03 ENCOUNTER — OFFICE VISIT (OUTPATIENT)
Dept: NEUROLOGY | Facility: CLINIC | Age: 59
End: 2024-09-03
Payer: COMMERCIAL

## 2024-09-03 VITALS
HEART RATE: 96 BPM | WEIGHT: 222.69 LBS | DIASTOLIC BLOOD PRESSURE: 74 MMHG | BODY MASS INDEX: 32.98 KG/M2 | SYSTOLIC BLOOD PRESSURE: 110 MMHG | HEIGHT: 69 IN

## 2024-09-03 DIAGNOSIS — H49.01 THIRD NERVE PALSY OF RIGHT EYE: Primary | ICD-10-CM

## 2024-09-03 DIAGNOSIS — H49.01 THIRD NERVE PALSY OF RIGHT EYE: ICD-10-CM

## 2024-09-03 DIAGNOSIS — H53.2 DIPLOPIA: ICD-10-CM

## 2024-09-03 LAB
ALBUMIN SERPL BCP-MCNC: 4 G/DL (ref 3.5–5.2)
ALP SERPL-CCNC: 53 U/L (ref 55–135)
ALT SERPL W/O P-5'-P-CCNC: 15 U/L (ref 10–44)
ANION GAP SERPL CALC-SCNC: 7 MMOL/L (ref 8–16)
AST SERPL-CCNC: 15 U/L (ref 10–40)
BASOPHILS # BLD AUTO: 0.1 K/UL (ref 0–0.2)
BASOPHILS NFR BLD: 1.8 % (ref 0–1.9)
BILIRUB SERPL-MCNC: 0.4 MG/DL (ref 0.1–1)
BUN SERPL-MCNC: 10 MG/DL (ref 6–20)
CALCIUM SERPL-MCNC: 9.8 MG/DL (ref 8.7–10.5)
CHLORIDE SERPL-SCNC: 107 MMOL/L (ref 95–110)
CO2 SERPL-SCNC: 24 MMOL/L (ref 23–29)
CREAT SERPL-MCNC: 1.1 MG/DL (ref 0.5–1.4)
DIFFERENTIAL METHOD BLD: NORMAL
EOSINOPHIL # BLD AUTO: 0.2 K/UL (ref 0–0.5)
EOSINOPHIL NFR BLD: 4.2 % (ref 0–8)
ERYTHROCYTE [DISTWIDTH] IN BLOOD BY AUTOMATED COUNT: 12.9 % (ref 11.5–14.5)
EST. GFR  (NO RACE VARIABLE): >60 ML/MIN/1.73 M^2
GLUCOSE SERPL-MCNC: 103 MG/DL (ref 70–110)
HCT VFR BLD AUTO: 48.9 % (ref 40–54)
HCV AB SERPL QL IA: NORMAL
HGB BLD-MCNC: 17 G/DL (ref 14–18)
HIV 1+2 AB+HIV1 P24 AG SERPL QL IA: NORMAL
IMM GRANULOCYTES # BLD AUTO: 0.02 K/UL (ref 0–0.04)
IMM GRANULOCYTES NFR BLD AUTO: 0.4 % (ref 0–0.5)
LYMPHOCYTES # BLD AUTO: 2 K/UL (ref 1–4.8)
LYMPHOCYTES NFR BLD: 36.5 % (ref 18–48)
MCH RBC QN AUTO: 30.7 PG (ref 27–31)
MCHC RBC AUTO-ENTMCNC: 34.8 G/DL (ref 32–36)
MCV RBC AUTO: 88 FL (ref 82–98)
MONOCYTES # BLD AUTO: 0.6 K/UL (ref 0.3–1)
MONOCYTES NFR BLD: 10.1 % (ref 4–15)
NEUTROPHILS # BLD AUTO: 2.5 K/UL (ref 1.8–7.7)
NEUTROPHILS NFR BLD: 47 % (ref 38–73)
NRBC BLD-RTO: 0 /100 WBC
PLATELET # BLD AUTO: 248 K/UL (ref 150–450)
PMV BLD AUTO: 10 FL (ref 9.2–12.9)
POTASSIUM SERPL-SCNC: 4.7 MMOL/L (ref 3.5–5.1)
PROT SERPL-MCNC: 7.6 G/DL (ref 6–8.4)
RBC # BLD AUTO: 5.54 M/UL (ref 4.6–6.2)
SODIUM SERPL-SCNC: 138 MMOL/L (ref 136–145)
TREPONEMA PALLIDUM IGG+IGM AB [PRESENCE] IN SERUM OR PLASMA BY IMMUNOASSAY: NONREACTIVE
TSH SERPL DL<=0.005 MIU/L-ACNC: 1.29 UIU/ML (ref 0.4–4)
WBC # BLD AUTO: 5.42 K/UL (ref 3.9–12.7)

## 2024-09-03 PROCEDURE — 99999 PR PBB SHADOW E&M-EST. PATIENT-LVL III: CPT | Mod: PBBFAC,,, | Performed by: STUDENT IN AN ORGANIZED HEALTH CARE EDUCATION/TRAINING PROGRAM

## 2024-09-03 PROCEDURE — 80053 COMPREHEN METABOLIC PANEL: CPT | Performed by: STUDENT IN AN ORGANIZED HEALTH CARE EDUCATION/TRAINING PROGRAM

## 2024-09-03 PROCEDURE — 86041 ACETYLCHOLN RCPTR BNDNG ANTB: CPT | Performed by: STUDENT IN AN ORGANIZED HEALTH CARE EDUCATION/TRAINING PROGRAM

## 2024-09-03 PROCEDURE — 84443 ASSAY THYROID STIM HORMONE: CPT | Performed by: STUDENT IN AN ORGANIZED HEALTH CARE EDUCATION/TRAINING PROGRAM

## 2024-09-03 PROCEDURE — 86803 HEPATITIS C AB TEST: CPT | Performed by: STUDENT IN AN ORGANIZED HEALTH CARE EDUCATION/TRAINING PROGRAM

## 2024-09-03 PROCEDURE — 87389 HIV-1 AG W/HIV-1&-2 AB AG IA: CPT | Performed by: STUDENT IN AN ORGANIZED HEALTH CARE EDUCATION/TRAINING PROGRAM

## 2024-09-03 PROCEDURE — 86593 SYPHILIS TEST NON-TREP QUANT: CPT | Performed by: STUDENT IN AN ORGANIZED HEALTH CARE EDUCATION/TRAINING PROGRAM

## 2024-09-03 PROCEDURE — 87798 DETECT AGENT NOS DNA AMP: CPT | Performed by: STUDENT IN AN ORGANIZED HEALTH CARE EDUCATION/TRAINING PROGRAM

## 2024-09-03 PROCEDURE — 85025 COMPLETE CBC W/AUTO DIFF WBC: CPT | Performed by: STUDENT IN AN ORGANIZED HEALTH CARE EDUCATION/TRAINING PROGRAM

## 2024-09-03 NOTE — PROGRESS NOTES
[facility]  Clinic Note    Date of Service: 9/3/2024  Patient seen at the request of: Self, Aaareferral    Reason for Consultation  3 rd CN palsy     Assessment:  Candelario Amaral is a 59 y.o. male who presents with isolated 3rd nerve palsy , occurred suddenly after he noticed an episode of blurred vision. Ophtalmology exam reported 3 rd nerve palsy, MRI Brain W/WOC and MRA with no significant findings. Received a prednisolone course which did not helps. No worsen or improving since started around 1 month ago. No weakness, numbness, end of the day worsen symptoms, no hx of smoking, drugs, no hx of DM , HTM or any chronic diseases. No hx of new medications.   Neurology exam other than R eyelid droop  are unremarkable.no toxic signs on exam ball , No significant wt.loss , cough, abdominal pain, urinary issues. My impression :isolated 3rd nerve palsy, so far idiopathic.       Plan:  #Isolated 3rd nerve palsy   - MRI orbit with /without contrast   -Neuro opthalmlogy referral for evaluation   -labs work including CBC, CMP, TSH, A1C, Lyme , HIV, hepatitis c, Anti-Musk , syphilis   -Advised to cover one eye if blurry vision is bothering  -Advice not to drive until improving     - RTC in 2 months       Signed:    Jaelyn Rabago MD  Neurology/Vascular neurology   09/03/2024 1:09 PM      HPI:  Candelario Amaral is a 59 y.o. male with No past medical history on file.    On 8/15 presented to the ED complains of eye drooping x 1 week, after send from ophthalmology clinic. Exam noted slight R eyebrow drooping, no pain with EOM on both eyes, no redness, discharges or toxic appearance. CTH done and was negative.     Patient reported he went to a road trip with his son to [place] and when he came back one day suddenly felt eyelid droop and blurred vision on and offx 1 week before he decided to visit a doctor. Went to ophthalmologist in [facility] when he is been told he has 3rd nerve palsy and send to the ED, in the ED CTH done which was  "negative then prescribed prednisolone course and discharged home. Since then he reported blurred vision on and off , no specific time of the day, only with R eye, did not worsen or improved. No eye pain, no swelling, no redness, denies any weakness, sensation changes, before or after this. Denies visiting the [place] and denies any tic pite that he remember.   No hx of proceeding infection, no hx of new medications, no hx of hypertension or diabetes whoever he did not visit a PCP for a long time.       Today he presented CD with MRI brain W/WO and MRA which I personally reviewed with no pathological findings suggest intracranial etiology.     Neurology exam noted for R eyelid droop, no weakness on the eye movement In all directions, no nystagmus,  fatigue test negative with on and off diplopia upon upward gaze, Cogan's lid twitch test negative.      This is the extent of the patient's complaints at this time.    No results found for: "TSH", "V12", "[id number]"      Review of Systems:  ROS negative unless noted in HPI    Past Surgical History:  Past Surgical History:   Procedure Laterality Date    APPENDECTOMY      COLONOSCOPY      COLONOSCOPY N/A 4/10/2017    Procedure: COLONOSCOPY;  Surgeon: Joseph Savage MD;  Location: [facility];  Service: Endoscopy;  Laterality: N/A;     Family History:  Family History   Problem Relation Name Age of Onset    Colon cancer Brother         Social History:  Social History     Substance Use Topics    Alcohol use: Yes    Drug use: No       Allergies:  Patient has no known allergies.    Outpatient Medications:  Prior to Admission medications    Medication Sig Start Date End Date Taking? Authorizing Provider   diclofenac (VOLTAREN) 50 MG EC tablet Take 1 tablet (50 mg total) by mouth 2 (two) times daily. 11/18/17   Frank Knox MD       Physical exam:    Vitals: There were no vitals taken for this visit.    General:   Sitting in chair, in no distress, well-nourished, " well-developed, appears stated age.  Head/Neck:   Normocephalic,atraumatic  Pulm:  Non-labored breathing     Mental Status: Alert and oriented to person, time, place, situation. Speech spontaneous and fluent without paraphasias; no dysarthria  Fundoscopy:Optic nerves: Clear margins bilaterally on funduscopic exam.  CN:  II: visual fields full  III, IV, VI: EOM intact without nystagmus , On and off diplopia jose R eye.   V: Sensation to light touch full and symmetric in V1-3. Masseter contraction full bilaterally.   VII: Facial movement full and symmetric.   VIII: Hearing grossly normal to conversation.  IX, X: Palate midline with symmetric elevation.    XI: SCM and trapezius: 5/5 bilaterally.   XII: Tongue midline without fasciculations.  Motor: Normal bulk and tone throughout all four extremities.   RUE: D: 5/5; B: 5/5; T:  5/5; WF:5/5; WE:  5/5; IO: 5/5   LUE: D: 5/5; B: 5/5; T:  5/5; WF: 5/5; WE:  5/5; IO: 5/5   RLE: HF: 5/5, KE: 5/5, KF: 5/5, DF: 5/5, PF: 5/5  LLE: HF: 5/5, KE: 5/5, KF: 5/5, DF: 5/5, PF: 5/5  No tremors   Sensory: Intact and symmetric to light touch throughout.  Reflexes: RUE: Triceps 2+, biceps 2+, brachioradialis 2+  LUE: Triceps 2+, biceps 2+ brachioradialis 2+  RLE: Knee 2+, ankle 2+  LLE: Knee 2+, ankle 2+  Coordination:  Intact and symmetric to finger-to-nose and heel-to-shin.  Gait:  Intact to casual gait.    Imaging:  All pertinent imaging was personally reviewed.    On my personal review:   MRI Brain W/WO   MRA  No intracranial lesions, enhancement, Diffusion restriciton  MRA no aneurysm , occlusion, stenosis        No results found for this or any previous visit.    No results found for this or any previous visit.    No results found for this or any previous visit.    No results found for this or any previous visit.    No results found for this or any previous visit.    No results found for this or any previous visit.    No results found for this or any previous visit.    No results  found for this or any previous visit.    No results found for this or any previous visit.    No results found for this or any previous visit.    No results found for this or any previous visit.    No results found for this or any previous visit.    No results found for this or any previous visit.    No results found for this or any previous visit.      I spent a total of 40 minutes on the day of the visit. This includes face to face time and non-face to face time preparing to see the patient (eg, review of tests), obtaining and/or reviewing separately obtained history, documenting clinical information in the electronic or other health record, independently interpreting results and communicating results to the patient/family/caregiver, or care coordinator.

## 2024-09-04 ENCOUNTER — TELEPHONE (OUTPATIENT)
Dept: OPHTHALMOLOGY | Facility: CLINIC | Age: 59
End: 2024-09-04
Payer: COMMERCIAL

## 2024-09-04 NOTE — TELEPHONE ENCOUNTER
Left message stating I was calling about referral in placed.     *Third nerve palsy of right eye *

## 2024-09-04 NOTE — TELEPHONE ENCOUNTER
----- Message from Joseph Wang sent at 9/4/2024  9:11 AM CDT -----  Regarding: Scheduling Request  Contact: [phone]  Scheduling Request           Appt Type:   H49.01 (ICD-10-CM) - Third nerve palsy of right eye     Date/Time Preference: Soonest appt     Treating Provider: Dr. Jorden Shepard Name: Alis     Contact Preference:  [phone]     Comments/notes:

## 2024-09-05 ENCOUNTER — TELEPHONE (OUTPATIENT)
Dept: OPHTHALMOLOGY | Facility: CLINIC | Age: 59
End: 2024-09-05
Payer: COMMERCIAL

## 2024-09-05 NOTE — TELEPHONE ENCOUNTER
----- Message from Florencia Alfonso sent at 9/5/2024  8:07 AM CDT -----  Regarding: Patient Returning Missed Call  Patient is returning missed call to f/u on scheduling.     Pts call back- [phone]

## 2024-09-07 LAB
B BURGDOR DNA BLD QL NAA+PROBE: NEGATIVE
B GAR+AFZ OPPA1 BLD QL NAA+NON-PROBE: NEGATIVE
B MAYONII OPPA1 BLD QL NAA+NON-PROBE: NEGATIVE
MICROBIOLOGIST REVIEW: NORMAL

## 2024-09-10 ENCOUNTER — PATIENT MESSAGE (OUTPATIENT)
Dept: NEUROLOGY | Facility: CLINIC | Age: 59
End: 2024-09-10
Payer: COMMERCIAL

## 2024-09-10 ENCOUNTER — HOSPITAL ENCOUNTER (OUTPATIENT)
Dept: RADIOLOGY | Facility: HOSPITAL | Age: 59
Discharge: HOME | End: 2024-09-10
Attending: STUDENT IN AN ORGANIZED HEALTH CARE EDUCATION/TRAINING PROGRAM
Payer: COMMERCIAL

## 2024-09-10 DIAGNOSIS — H53.2 DIPLOPIA: ICD-10-CM

## 2024-09-10 PROCEDURE — 70543 MRI ORBT/FAC/NCK W/O &W/DYE: CPT | Mod: TC,PN

## 2024-09-10 PROCEDURE — A9585 GADOBUTROL INJECTION: HCPCS | Mod: PN | Performed by: STUDENT IN AN ORGANIZED HEALTH CARE EDUCATION/TRAINING PROGRAM

## 2024-09-10 PROCEDURE — 25500020 PHARM REV CODE 255: Mod: PN | Performed by: STUDENT IN AN ORGANIZED HEALTH CARE EDUCATION/TRAINING PROGRAM

## 2024-09-10 PROCEDURE — 70543 MRI ORBT/FAC/NCK W/O &W/DYE: CPT | Mod: 26,,, | Performed by: RADIOLOGY

## 2024-09-10 RX ORDER — GADOBUTROL 604.72 MG/ML
10 INJECTION INTRAVENOUS
Status: COMPLETED | OUTPATIENT
Start: 2024-09-10 | End: 2024-09-10

## 2024-09-10 RX ADMIN — GADOBUTROL 10 ML: 604.72 INJECTION INTRAVENOUS at 01:09

## 2024-09-10 NOTE — TELEPHONE ENCOUNTER
MRI reviewed, no intracranial mass, enhancement  , or any abnormalities suggest intracranial pathology . will discuss results with the patient next visit .

## 2024-09-16 ENCOUNTER — OFFICE VISIT (OUTPATIENT)
Dept: OPHTHALMOLOGY | Facility: CLINIC | Age: 59
End: 2024-09-16
Payer: COMMERCIAL

## 2024-09-16 DIAGNOSIS — H02.401 PTOSIS OF RIGHT UPPER EYELID: ICD-10-CM

## 2024-09-16 DIAGNOSIS — H50.00 ESOTROPIA OF BOTH EYES: Primary | ICD-10-CM

## 2024-09-16 DIAGNOSIS — D31.31 CHOROIDAL NEVUS, RIGHT EYE: ICD-10-CM

## 2024-09-16 PROCEDURE — 92060 SENSORIMOTOR EXAMINATION: CPT | Mod: S$GLB,,, | Performed by: STUDENT IN AN ORGANIZED HEALTH CARE EDUCATION/TRAINING PROGRAM

## 2024-09-16 PROCEDURE — 99999 PR PBB SHADOW E&M-EST. PATIENT-LVL II: CPT | Mod: PBBFAC,,, | Performed by: STUDENT IN AN ORGANIZED HEALTH CARE EDUCATION/TRAINING PROGRAM

## 2024-09-16 PROCEDURE — 1159F MED LIST DOCD IN RCRD: CPT | Mod: CPTII,S$GLB,, | Performed by: STUDENT IN AN ORGANIZED HEALTH CARE EDUCATION/TRAINING PROGRAM

## 2024-09-16 PROCEDURE — 1160F RVW MEDS BY RX/DR IN RCRD: CPT | Mod: CPTII,S$GLB,, | Performed by: STUDENT IN AN ORGANIZED HEALTH CARE EDUCATION/TRAINING PROGRAM

## 2024-09-16 PROCEDURE — 99204 OFFICE O/P NEW MOD 45 MIN: CPT | Mod: S$GLB,,, | Performed by: STUDENT IN AN ORGANIZED HEALTH CARE EDUCATION/TRAINING PROGRAM

## 2024-09-16 NOTE — PROGRESS NOTES
MANISHA Amaral is a 59 y.o. male who comes in for an evaluation of   diplopia. Referred by neurologist, Dr. Jaelyn Rabago for 3rd nerve palsy OD   eval. Pt c/o sudden onset of double vision that started 2 month ago. Pt   states that double vision is intermittent, but occurring the majority of   the day and when he closes his right eye, double vision goes away. RUL   droopiness, blurry vision associated with double vision. Pt denies   headaches, flashes, floaters.     Eye meds: none       Last edited by Martell Luna MD on 9/16/2024  9:31 AM.        ROS    Negative for: Constitutional, Gastrointestinal, Neurological, Skin,   Genitourinary, Musculoskeletal, HENT, Endocrine, Cardiovascular, Eyes,   Respiratory, Psychiatric, Allergic/Imm, Heme/Lymph  Last edited by Martell Luna MD on 9/16/2024  9:31 AM.        Assessment /Plan     For exam results, see Encounter Report.    Esotropia of both eyes  -     Ambulatory referral/consult to Ophthalmology    Ptosis of right upper eyelid    Choroidal nevus, right eye        Problem List Items Addressed This Visit          Ophtho    Esotropia of both eyes - Primary    Current Assessment & Plan     Patient with no significant past medical history who presents for evaluation of possible third nerve palsy of the right side  He woke up one day in early August with new onset double vision and eyelid droop. No associated symptoms. No recent viral illness  He was seen in ER on 8/15/24 and diagnosed with third nerve palsy  CT Head at that visit was normal  Seen by Neurology 9/3/24 and thought to have third nerve palsy, right  MRI Brain 9/10/24: unremarkable  Lab workup: negative /normal: CBC/CMP, HIV, Lyme, Treponema  Pending: Myasthenia panel    9/16/24: Patient reports fluctuatinon of symptoms during the day during the day  On exam, has variable RUL ptosis with POSITIVE ice pack test ; no anisocoria ;   EOM testing shows mild abduction deficit of left eye  and and esophoria  that buils to an esotropia in left gaze only  Rest of eye exam normal    Plan:  Findings today NOT consistent with third nerve palsy  Strongly suspect myasthenia gravis (given variable ptosis with + ice pack test) with small ET vs isolated microvascular sixth nerve palsy, left and isolated RUL ptosis  Will await myasthenia labs and discuss case with Neurology  If blood work negative, may benefit from in office single-fiber EMG testing?  RTC 3 months or sooner PRN         Ptosis of right upper eyelid    Choroidal nevus, right eye    Current Assessment & Plan     Flat, no associated orange pigment or SRF    Plan;  Recommend yearly monitoring             Martell Luna MD  Pediatric Ophthalmology and Adult Strabismus  [facility]      Time spent on this encounter: 45 minutes. This includes face to face time and non-face to face time preparing to see the patient (eg, review of tests, records, etc.), obtaining and/or reviewing separately obtained history, documenting clinical information in the electronic or other health record, examining patient, independently interpreting results and communicating results to the patient/family/caregiver, or care coordinator.

## 2024-09-16 NOTE — ASSESSMENT & PLAN NOTE
Patient with no significant past medical history who presents for evaluation of possible third nerve palsy of the right side  He woke up one day in early August with new onset double vision and eyelid droop. No associated symptoms. No recent viral illness  He was seen in ER on 8/15/24 and diagnosed with third nerve palsy  CT Head at that visit was normal  Seen by Neurology 9/3/24 and thought to have third nerve palsy, right  MRI Brain 9/10/24: unremarkable  Lab workup: negative /normal: CBC/CMP, HIV, Lyme, Treponema  Pending: Myasthenia panel    9/16/24: Patient reports fluctuatinon of symptoms during the day during the day  On exam, has variable RUL ptosis with POSITIVE ice pack test ; no anisocoria ;   EOM testing shows mild abduction deficit of left eye  and and esophoria that buils to an esotropia in left gaze only  Rest of eye exam normal    Plan:  Findings today NOT consistent with third nerve palsy  Strongly suspect myasthenia gravis (given variable ptosis with + ice pack test) with small ET vs isolated microvascular sixth nerve palsy, left and isolated RUL ptosis  Will await myasthenia labs and discuss case with Neurology  If blood work negative, may benefit from in office single-fiber EMG testing?  RTC 3 months or sooner PRN

## 2024-09-20 ENCOUNTER — PATIENT MESSAGE (OUTPATIENT)
Dept: NEUROLOGY | Facility: CLINIC | Age: 59
End: 2024-09-20
Payer: COMMERCIAL

## 2024-09-20 DIAGNOSIS — G70.00 MYASTHENIA GRAVIS, ACHR ANTIBODY POSITIVE: Primary | ICD-10-CM

## 2024-09-20 RX ORDER — PYRIDOSTIGMINE BROMIDE 60 MG/1
60 TABLET ORAL
Qty: 90 TABLET | Refills: 11 | Status: SHIPPED | OUTPATIENT
Start: 2024-09-20 | End: 2025-09-20

## 2024-09-20 NOTE — TELEPHONE ENCOUNTER
I called Mr. Amaral to discuss the lab results:  -acetylcholine binding antibody came back positive, we discussed the diagnosis of myasthenia gravis, likely orbital time.   -plan to start pyridostigmine 60 mg t.i.d.  -ordered CT chest without contrast  -will refer to Neuro muscular  Clinic for further discussion regarding long-term treatment.     Mr. Munir Amaral questions answered and he expressed understanding the plan.

## 2024-10-22 ENCOUNTER — OFFICE VISIT (OUTPATIENT)
Dept: NEUROLOGY | Facility: CLINIC | Age: 59
End: 2024-10-22
Payer: COMMERCIAL

## 2024-10-22 VITALS
HEIGHT: 69 IN | BODY MASS INDEX: 33.92 KG/M2 | DIASTOLIC BLOOD PRESSURE: 86 MMHG | WEIGHT: 229 LBS | SYSTOLIC BLOOD PRESSURE: 141 MMHG | HEART RATE: 73 BPM

## 2024-10-22 DIAGNOSIS — G70.00 MYASTHENIA GRAVIS, ACHR ANTIBODY POSITIVE: Primary | ICD-10-CM

## 2024-10-22 PROCEDURE — 99999 PR PBB SHADOW E&M-EST. PATIENT-LVL III: CPT | Mod: PBBFAC,,, | Performed by: STUDENT IN AN ORGANIZED HEALTH CARE EDUCATION/TRAINING PROGRAM

## 2024-10-22 PROCEDURE — 3077F SYST BP >= 140 MM HG: CPT | Mod: CPTII,S$GLB,, | Performed by: STUDENT IN AN ORGANIZED HEALTH CARE EDUCATION/TRAINING PROGRAM

## 2024-10-22 PROCEDURE — 3079F DIAST BP 80-89 MM HG: CPT | Mod: CPTII,S$GLB,, | Performed by: STUDENT IN AN ORGANIZED HEALTH CARE EDUCATION/TRAINING PROGRAM

## 2024-10-22 PROCEDURE — 3008F BODY MASS INDEX DOCD: CPT | Mod: CPTII,S$GLB,, | Performed by: STUDENT IN AN ORGANIZED HEALTH CARE EDUCATION/TRAINING PROGRAM

## 2024-10-22 PROCEDURE — 99214 OFFICE O/P EST MOD 30 MIN: CPT | Mod: S$GLB,,, | Performed by: STUDENT IN AN ORGANIZED HEALTH CARE EDUCATION/TRAINING PROGRAM

## 2024-10-22 PROCEDURE — 1159F MED LIST DOCD IN RCRD: CPT | Mod: CPTII,S$GLB,, | Performed by: STUDENT IN AN ORGANIZED HEALTH CARE EDUCATION/TRAINING PROGRAM

## 2024-10-22 PROCEDURE — 1160F RVW MEDS BY RX/DR IN RCRD: CPT | Mod: CPTII,S$GLB,, | Performed by: STUDENT IN AN ORGANIZED HEALTH CARE EDUCATION/TRAINING PROGRAM

## 2024-10-22 NOTE — PROGRESS NOTES
GENERAL NEUROLOGY  FOLLOW UP VISIT     SUBJECTIVE      Patient finished steroid course, reported no benefit.  Currently on Mestinon 90 mg t.i.d., reported when he wakes up in the morning his eye looks okay and after few hours started to shut down not able to elevate the eyelid.  During interview notice that the eyelid improved for few minutes then which showed down for a minute and then back to normal.  I discussed with him that need specialized clinic to evaluate his case for other medication options like immune suppressant , plasmapheresis or IVIG.  I have sent him for CT chest which has not done yet.  Denies any other symptoms weakness in the arms or legs, shortness of breath.     Neurology exam today notable for ptosis in the right eye, on and off, eye movement all directions felt improved from before.         Past Surgical History:   Procedure Laterality Date    APPENDECTOMY      COLONOSCOPY      COLONOSCOPY N/A 4/10/2017    Procedure: COLONOSCOPY;  Surgeon: Joseph Savage MD;  Location: [facility];  Service: Endoscopy;  Laterality: N/A;       Social History     Socioeconomic History    Marital status:    Tobacco Use    Smoking status: Never    Smokeless tobacco: Current     Types: Chew   Substance and Sexual Activity    Alcohol use: Yes    Drug use: No   Social History Narrative    ** Merged History Encounter **          Social Drivers of Health     Financial Resource Strain: Unknown (3/25/2021)    Overall Financial Resource Strain (CARDIA)     Difficulty of Paying Living Expenses: Patient declined   Food Insecurity: Unknown (3/25/2021)    Hunger Vital Sign     Worried About Running Out of Food in the Last Year: Patient declined     Ran Out of Food in the Last Year: Patient declined   Transportation Needs: Unknown (3/25/2021)    PRAPARE - Transportation     Lack of Transportation (Medical): Patient declined     Lack of Transportation (Non-Medical): Patient declined   Physical Activity:  Unknown (3/25/2021)    Exercise Vital Sign     Days of Exercise per Week: Patient declined     Minutes of Exercise per Session: Patient declined   Stress: Unknown (3/25/2021)    [employer] - Occupational Stress Questionnaire     Feeling of Stress : Patient declined       Review of patient's allergies indicates:  No Known Allergies    Current Outpatient Medications on File Prior to Visit   Medication Sig Dispense Refill    diclofenac (VOLTAREN) 50 MG EC tablet Take 1 tablet (50 mg total) by mouth 2 (two) times daily. (Patient not taking: Reported on 9/3/2024) 21 tablet 0    pyridostigmine (MESTINON) 60 mg Tab Take 1.5 tablets (90 mg total) by mouth 3 (three) times daily before meals. 135 tablet 11     No current facility-administered medications on file prior to visit.        Family history:  @FAM@    Review Of Systems     Constitutional Negative for fevers, chills, weigh loss   HEENT Negative for hearing loss, dysphagia, sore throat, diplopia   Respiratory Negative for shortness of breath, cough    Cardiovascular Negative for chest pain, palpitations    Gastrointestinal Negative for constipation, diarrhea, early satiety    Skin Negative for rashes    Musculoskeletal Negative for joint pains, myalgias.   Neurological See Above    Psychological Negative for sleep disturbances.    Heme/Lymph Negative for easy bruising, easy bleeding    Endocrine Negative for polyuria, polydypsia     Physical Exam:     Physical Examination  There were no vitals taken for this visit.  There is no height or weight on file to calculate BMI.      Neurological Exam  Mental Status:   Alert and oriented to name, date, location, president.     CN:   II, III, IV, VI: PERRL, EOMI,R eye ptosis , no nystagmus, V: intact to fine touch, temperature, pain.VII: symmetrical facial movement, no drooping.VIII: grossly intact to hearing.IX, X: symmetrical palate, normal palatal elevation.XI: 5/5 SCM and 5/5 trapezius.XII:  tongue midline, 5/5, no deviation or fasciculation.           Motor:    ShAb ElbEx ElbFle WrE WrFle Interos  HipFl KnExt KnFlex FtDors FtPlant   Left 5 5 5 5 5 5 5 5 5 5 5 5   Right 5 5 5 5 5 5 5 5 5 5 5 5   Tone: Normal tone in UE and LE, no atrophy, no fasciculation, no tremor no pronator drift.                Reflexes:    Bicep Tricep Brachioradial Patellar Achilles   Left 2+ 2+ 2+ 2+ 1+   Right 2+ 2+ 2+ 2+ 1+   No Clonus, down going toes b/l.    Sensation: on both UEs and LEs    Light Touch: Normal.Pinprick: Normal.Proprioception: Normal.Vibration: Normal.Temperature: Normal.    Coordination:    Tremor: Absent.Dysmetria: Absent.Heel to Shin: Normal.Nose to Finger: Normal.    Gait:      Normal gait     Interval/Previous Work-up:   MRI orbit 9/10/2024  mpression:  MR evaluation of the globes and orbits appears within normal limits.  No acute intracranial abnormality.  No abnormal intracranial enhancement.    MRI brain and MRA 8/20/2024  No acute findings     Impression:      Candelario Amaral is a 59 y.o. male who presents with isolated 3rd nerve palsy , occurred suddenly after he noticed an episode of blurred vision. Ophtalmology exam reported 3 rd nerve palsy, MRI Brain W/WOC and MRA with no significant findings. Received a prednisolone course which did not helps. No worsen or improving since started. Blood work showed positive acetylcholine binding antibody and Style call you receptor modulating flow cytometry.  Patient started on Mestinon 60 mg t.i.d. then increase to 90 mg t.i.d. due to minimal response.  Also another prednisone course trial done for 2 weeks with no response.  Patient reported minimal response to Mestinon.  Symptoms still isolated to the right eye with on and off ptosis specially when used throughout the day and what he watching TV at night.  Denies any other weakness in upper extremities or lower extremity, no shortness of breath.  Neurology exam today on and off right eyelid droop, right eye  movement all direction felt improving from last visits.     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         Time spent on this encounter:  35 minutes. This includes face to face time and non-face to face time preparing to see the patient (eg, review of tests), obtaining and/or reviewing separately obtained history, documenting clinical information in the electronic or other health record, independently interpreting results and communicating results to the patient/family/caregiver, or care coordinator.       A dictation device was used to produce this document. Use of such devices sometimes results in grammatical errors or replacement of words that sound similarly.     Jaelyn Rabago MD, M.B.Ch.B  Neurology, Vascular neurology  [facility]

## 2024-10-30 ENCOUNTER — TELEPHONE (OUTPATIENT)
Dept: NEUROLOGY | Facility: CLINIC | Age: 59
End: 2024-10-30
Payer: COMMERCIAL

## 2024-11-04 ENCOUNTER — PATIENT MESSAGE (OUTPATIENT)
Dept: RESEARCH | Facility: HOSPITAL | Age: 59
End: 2024-11-04
Payer: COMMERCIAL

## 2024-11-08 ENCOUNTER — LAB VISIT (OUTPATIENT)
Dept: LAB | Facility: HOSPITAL | Age: 59
End: 2024-11-08
Attending: PSYCHIATRY & NEUROLOGY
Payer: COMMERCIAL

## 2024-11-08 ENCOUNTER — OFFICE VISIT (OUTPATIENT)
Dept: NEUROLOGY | Facility: CLINIC | Age: 59
End: 2024-11-08
Payer: COMMERCIAL

## 2024-11-08 VITALS
WEIGHT: 231.69 LBS | BODY MASS INDEX: 34.32 KG/M2 | HEIGHT: 69 IN | SYSTOLIC BLOOD PRESSURE: 143 MMHG | DIASTOLIC BLOOD PRESSURE: 87 MMHG | HEART RATE: 67 BPM

## 2024-11-08 DIAGNOSIS — G70.00 MYASTHENIA GRAVIS, ACHR ANTIBODY POSITIVE: ICD-10-CM

## 2024-11-08 DIAGNOSIS — H02.401 PTOSIS OF RIGHT UPPER EYELID: Primary | ICD-10-CM

## 2024-11-08 DIAGNOSIS — Z78.9 ALCOHOL CONSUMPTION HEAVY: ICD-10-CM

## 2024-11-08 LAB
CK SERPL-CCNC: 97 U/L (ref 20–200)
ESTIMATED AVG GLUCOSE: 105 MG/DL (ref 68–131)
HBA1C MFR BLD: 5.3 % (ref 4–5.6)

## 2024-11-08 PROCEDURE — 82607 VITAMIN B-12: CPT | Performed by: PSYCHIATRY & NEUROLOGY

## 2024-11-08 PROCEDURE — 84425 ASSAY OF VITAMIN B-1: CPT | Performed by: PSYCHIATRY & NEUROLOGY

## 2024-11-08 PROCEDURE — 83036 HEMOGLOBIN GLYCOSYLATED A1C: CPT | Performed by: PSYCHIATRY & NEUROLOGY

## 2024-11-08 PROCEDURE — 83921 ORGANIC ACID SINGLE QUANT: CPT | Performed by: PSYCHIATRY & NEUROLOGY

## 2024-11-08 PROCEDURE — 99999 PR PBB SHADOW E&M-EST. PATIENT-LVL III: CPT | Mod: PBBFAC,,, | Performed by: PSYCHIATRY & NEUROLOGY

## 2024-11-08 PROCEDURE — 36415 COLL VENOUS BLD VENIPUNCTURE: CPT | Performed by: PSYCHIATRY & NEUROLOGY

## 2024-11-08 PROCEDURE — 80321 ALCOHOLS BIOMARKERS 1OR 2: CPT | Performed by: PSYCHIATRY & NEUROLOGY

## 2024-11-08 PROCEDURE — 82550 ASSAY OF CK (CPK): CPT | Performed by: PSYCHIATRY & NEUROLOGY

## 2024-11-08 RX ORDER — PREDNISONE 5 MG/1
60 TABLET ORAL DAILY
Qty: 500 TABLET | Refills: 1 | Status: SHIPPED | OUTPATIENT
Start: 2024-11-08

## 2024-11-08 RX ORDER — LYSINE HCL 500 MG
1 TABLET ORAL ONCE
Qty: 1 TABLET | Refills: 0 | Status: SHIPPED | OUTPATIENT
Start: 2024-11-08 | End: 2024-11-08

## 2024-11-08 RX ORDER — MULTIVITAMIN
1 TABLET ORAL DAILY
COMMUNITY

## 2024-11-08 RX ORDER — OMEPRAZOLE 40 MG/1
40 CAPSULE, DELAYED RELEASE ORAL DAILY
Qty: 90 CAPSULE | Refills: 3 | Status: SHIPPED | OUTPATIENT
Start: 2024-11-08 | End: 2025-11-08

## 2024-11-08 NOTE — PATIENT INSTRUCTIONS
"Myasthenia Gravis IMPORTANT INFORMATION:     Elective intubation should be considered if serial measurements of the VC show values less than 20 mL/kg or if the NIF is worse than -30 cm H20 or is progressively worsening after serial evaluation.        Absolute Contraindicated Medications (Category 1) Contraindicated Medications (Category 2) Likely to Worsen MG Cautionary Medications  (Category 3) That May Worsen MG but are Usually Tolerated   Curare  Botulinum toxin  D-penicillamine  Interferon alpha     Aminoglycoside (Gentamycin, Kanamycin, Streptomycin, Neomycin, Tobramycin)  Macrolide (Erythromycin, Azithromycin, Telithromycin, Biaxin, Clarithromycin)  Fluoroquinolone - (Ciprofloxacin, Moxifloxacin, Levofloxacin, Delafloxacin, Norfloxacin, Prulifloxacin)  Quinine (Use only for Malaria)  Quinidine  Procainamide  Magnesium salts, IV magnesium replacement  Chloroquine  Hydroxychloroquine  Immune checkpoint inhibitors: Pembrolizumab (Keytruda), Nivolumab (Opdivo), Atezolizumab (Tecentriq), Avelumab (Bavencio), Durvalumab (Imfinzi), Ipilimumab (Yervoy)    Atropine   Corticosteroids  Desferrioxamine  Beta blockers  Statins  Iodinated radiologic contrast agents  Lithium  Benzodiazepines   Calcium Channel Blockers (verapamil)   Doxacurium  Neuromuscular blockades (Succinylcholine, Cisatracurium, Rocuronium, Vecuronium, Atracurium)  Diclomine         THIS PATIENT HAS MYASTHENIA GRAVIS     USE THESE DRUGS WITH CAUTION   1. Antibiotics of the Following Classes               A. Aminoglycosides (end in "mycin", "micin" e.g. Neomycin, tobramycin, gentamicin)               B. Flagyl (metronidazole)               C. Macrolides (e.g. Erythromycin, azithromycin (Zithromax), clarithromycin)   2. Beta Blockers (oral, parenteral and ophthalmic preparations)               A. (end in "olol" e.g. Atenolol, labetalol, metoprolol, propranolol, sotalol, timolol, etc)   3. Calcium Channel Blockers (end in "ipine" e.g. Amlodipine (Norvasc), " "nicardipine, nifedipine (Procardia), felodipine (Plendil))   4. Corticosteroids & ACTH   5. Interferons   6. Magnesium   7. Narcotic analgesics (if there is respiratory compromise e.g. Demerol, morphine)   8. Phenothiazines (end in "zine" e.g. Compazine, chlorpromazine (Thorazine), fluphenazine (Prolixin), perphenazine (Trilafon), Sparine)   9. Respiratory Depressants   10. Sedatives and Hypnotics       THESE DRUGS SHOULD *NOT* BE USED IN PATIENTS WITH MYASTHENIA GRAVIS WITHOUT PRIOR DISCUSSION WITH A NEUROMUSCULAR SPECIALIST   1. Ketolides (e.g. Telithromycin) - FDA BLACK BOX WARNING - Do NOT Use   2. Fluoroquinolones (end in "acin" e.g. Levofloxacin (Levaquin), ciprofloxacin (CIPRO), moxifloxacin (Avelox) - FDA BLACK BOX WARNING   3. Botulinum toxin   4. D-Penicillamine       NURSES -- TRIPLE CHECK BEFORE GIVING THE FOLLOWING DUE TO THE HIGH PROBABILITY OF PROLONGED WEAKNESS OR MG CRISIS   1. Neuromuscular blocking agent (both depolarizing and non-depolarizing)               A. Succinylcholine-like               B. Curare-like   2. Quinidine   3. Quinine    "

## 2024-11-08 NOTE — PROGRESS NOTES
[facility] - NEUROLOGY 7TH [facility], [place]    Date: 11/8/24  Patient Name: Candelario Amaral   MRN: [id number]   Referring Provider: Jaelyn Rabago MD    Thank you so much Jaelyn Rabago MD for your patient referral to Neuromuscular team at [facility]. We take pride in our care coordination and look forward to your feedback and questions.    Assessment:   59-year-old male with antibody positive ocular myasthenia gravis for evaluation and 2nd opinion regarding further care.  I discussed about prognosis of ocular myasthenia gravis with progression to generalized myasthenia gravis.  The evidence guide us about using steroids early but he has noticed no improvement in his symptoms previously with steroids but I will do a good dose trial of prednisone but I also discussed other medication for immunosuppression as his symptoms are not controlled with Mestinon previously.  I provided him with a list of medications which can be potentially used in myasthenia gravis.      2/3 patients experience weakness in ocular muscles 1st.  Up to 75% patients with ocular myasthenia gravis develop generalized weakness with in 2-3 years. (Joanne D, Brunner N, Scout T, Gregory QUIROS. Lifetime course of myasthenia gravis.Muscle Nerve 2008; 37(2):141-149. doi:10.1002/mus.40731)    I discussed about myasthenia gravis precautions and need for healthy lifestyle. I addressed his complaints. I provided information about fall precautions and healthy lifestyle. I would wish him very best for improvement/recovery in his condition.    Virtual consultation after 1 month to start oral immunosuppression in case of steroid trial failure or for add on therapy to low-dose steroids.  Follow up in clinic in 3 months.    Future direction based on feedback:    Plan:     Problem List Items Addressed This Visit          Neuro    Myasthenia gravis, AChR antibody positive    Relevant Medications    predniSONE (DELTASONE) 5 MG tablet    calcium  carbonate-vit D3-min 600 mg-10 mcg (400 unit) Tab    omeprazole (PRILOSEC) 40 MG capsule    Other Relevant Orders    VITAMIN B1    Vitamin B12 Deficiency Panel    HEMOGLOBIN A1C    Calcitriol (1,25 di-OH Vitamin D)    CK       Ophtho    Ptosis of right upper eyelid - Primary     Other Visit Diagnoses       Alcohol consumption heavy        Relevant Orders    Phosphatidylethanol (PETH)    VITAMIN B1    Vitamin B12 Deficiency Panel    CK          Marley Bennett MD    This evaluation was completed in >60  Minutes over 50% of the time spent on education & counseling. This includes face to face time and non-face to face time preparing to see the patient (eg, review of tests), obtaining and/or reviewing separately obtained history, documenting clinical information in the electronic or other health record, independently interpreting results and communicating results to the patient/family/caregiver, or care coordinator.    Visit today is associated with current or anticipated ongoing medical care related to this patient's single serious condition/complex condition (myasthenia gravis). Follow up: 3  months    Patient note was created using MModal Dictation.  Any errors in syntax or even information may not have been identified and edited on initial review prior to signing this note.    Details provided by:    Patient  Family- Wife (Alis Amaral)    Reason for visit:  Myasthenia gravis for evaluation.      HISTORY OF PRESENT ILLNESS     History of Present Illness  The patient is a 59-year-old male presenting for antibody-positive myasthenia gravis. He is accompanied by his wife.    He began experiencing symptoms on 07/28/2024, following a 37-hour drive from [place] to [place] with his son. The day after the drive, he noticed his right eye drooping and began experiencing double and triple vision. Initially, he attributed these symptoms to an allergy, as his son had been in contact with a dog. He was referred to the  emergency room for an MRI or CAT scan.     He was prescribed Mestinon 60 mg twice daily for 10 days, but this did not alleviate his symptoms.  He was tried on Mestinon and the dose was increased to 90 mg 3 times a day but would was prove ineffective as well.  He discontinued the medication last week.  He is book for his CT scan of the chest for thymic tissue.    He reports no pain, difficulty swallowing, breathing issues, or difficulty navigating stairs. He also reports no bladder control issues or other medical conditions. His double vision is intermittent, occurring during the day, while his eye drooping seemingly constant. He reports no slurred speech, fatigue while chewing food, or difficulty brushing his teeth. He does not need to push on armrests to rise from a chair.    He has not consumed alcohol for the past 2 months while on medication but has resumed drinking since stopping the medication. He snores when tired.  Wife feels that she drinks alcohol too much.    SOCIAL HISTORY  He is a retired . He smokes and drinks alcohol. He dips tobacco.    FAMILY HISTORY  He denies any family history of neuromuscular condition.    Myasthenia Gravis Activities of Daily Living Scale     Grade   Function 0 1 2 3   Double Vision None Occasional, not every day Daily, but not constant Constant      2    Eyelid Droop None Occasional, not every day Daily, but not constant Constant      2    Talking Normal Intermittent slurring or nasal speech Constant slurring or nasal speech, but can be understood Speech difficult to understand    0      Chewing Normal Fatigues with solid food Fatigues with soft food Gastric tube    0      Swallowing Normal Chokes rarely Frequent choking requiring change of diet Gastric tube    0      Breathing Normal Shortness of breath on exertion Shortness of breath at rest Ventilator    0      Brushing teeth or hair Normal Requires extra effort but requires no rest period Rest periods needed  "Cannot do one or more of these functions    0      Ability to rise from chair or toilet Normal Sometimes uses arms Always uses arms Requires assistance    0      Total MG ADL Score 4       Pertinent work up based on chart review for current condition:  Acetylcholine receptor binding (5.51) and modulating antibody positive   HIV 1 and 2 antibody negative   Lyme serology negative   Hepatitis-C negative   TSH 1.2   Comprehensive metabolic panel normal   CBC with MCV 88     MRI brain and orbit on 08/20/2024.    Normal    Review of Systems:  12 system review of systems is negative except for the symptoms mentioned in HPI.     PHYSICAL EXAMINATION     Vitals:    11/08/24 0915   BP: (!) 143/87   BP Location: Right arm   Patient Position: Sitting   Pulse: 67   Weight: 105.1 kg (231 lb 11.3 oz)   Height: 5' 9" (1.753 m)       Body mass index is 34.22 kg/m².     GENERAL/CONSTITUTIONAL/SYSTEMIC:    -Well appearing; well nourished  -obese   -Herrera Palate-2    HIGHER INTEGRATIVE FUNCTIONS:   -Attention & concentration: Normal   -Orientation: Oriented to person, place & time  -Memory: Normal  -Language: Normal   -Fund of Knowledge: Normal     CRANIAL NERVES:   -CN 2: Visual fields full  -CN 2,3: PERRL, mild right eye ptosis not covering pupil  -CN 3,4,6: EOMI with mild diplopia which improves by covering 1 eye especially while looking up.  -CN 5: Facial sensation intact bilaterally  -CN 7: Facial strength/movement intact bilaterally, over activation of right frontalis  -CN 8: Hearing normal bilaterally  -CN 9,10: Palate elevates symmetrically  -CN 11: Normal shoulder shrug and head turn, no neck flexion/extension weakness  -CN 12: Tongue protrudes midline     MOTOR:   -Tone: normal in upper and lower extremities  -UE/LE motor: 5/5 throughout, no pronator drift  -no fatigability on repetitive testing     SENSATION:   -Intact bilaterally to Vibration and pin prick    REFLEXES:   -2/4 upper and lower extremities " bilaterally  -Flexor plantar reflex bilaterally    COORDINATION:   -FNF normal bilaterally    GAIT:   -Normal casual  gait. Able to stand on heels and toes without difficulty.  -no difficulty in squatting    Scheduled Follow-up :  Future Appointments   Date Time Provider Department Center   11/8/2024  9:00 AM Marley Bennett MD [facility] NEURO [address]   12/16/2024  3:30 PM Martell Luna MD [facility]       After Visit Medication List :     Medication List            Accurate as of November 8, 2024  7:42 AM. If you have any questions, ask your nurse or doctor.                CONTINUE taking these medications      diclofenac 50 MG EC tablet  Commonly known as: VOLTAREN  Take 1 tablet (50 mg total) by mouth 2 (two) times daily.     pyridostigmine 60 mg Tab  Commonly known as: MESTINON  Take 1.5 tablets (90 mg total) by mouth 3 (three) times daily before meals.              Signing Physician:          Marley Bennett MD  , [facility] [employer]  Section Head Neuromuscular Medicine. [facility].   [address],  [facility] [place]. [address]th [place] [address].    This note was generated with the assistance of ambient listening technology. Verbal consent was obtained by the patient and accompanying visitor(s) for the recording of patient appointment to facilitate this note. I attest to having reviewed and edited the generated note for accuracy, though some syntax or spelling errors may persist. Please contact the author of this note for any clarification.

## 2024-11-09 LAB — VIT B12 SERPL-MCNC: 153 NG/L (ref 180–914)

## 2024-11-11 ENCOUNTER — PATIENT MESSAGE (OUTPATIENT)
Dept: NEUROLOGY | Facility: CLINIC | Age: 59
End: 2024-11-11
Payer: COMMERCIAL

## 2024-11-11 LAB
CLINICAL BIOCHEMIST REVIEW: NORMAL
PLPETH BLD-MCNC: 117 NG/ML
POPETH BLD-MCNC: 132 NG/ML

## 2024-11-12 ENCOUNTER — TELEPHONE (OUTPATIENT)
Dept: NEUROLOGY | Facility: CLINIC | Age: 59
End: 2024-11-12
Payer: COMMERCIAL

## 2024-11-12 NOTE — TELEPHONE ENCOUNTER
----- Message from Marley Bennett MD sent at 11/8/2024 10:14 AM CST -----  Please coordinate to get vaccination for this ocular myasthenia gravis patient who may potentially need therapies in future.  Thank you

## 2024-11-13 ENCOUNTER — PATIENT MESSAGE (OUTPATIENT)
Dept: RESEARCH | Facility: HOSPITAL | Age: 59
End: 2024-11-13
Payer: COMMERCIAL

## 2024-11-13 LAB
METHYLMALONATE SERPL-SCNC: 0.17 NMOL/ML
VIT B1 BLD-MCNC: 64 UG/L (ref 38–122)

## 2024-12-06 ENCOUNTER — TELEPHONE (OUTPATIENT)
Dept: NEUROLOGY | Facility: CLINIC | Age: 59
End: 2024-12-06
Payer: COMMERCIAL

## 2024-12-06 NOTE — TELEPHONE ENCOUNTER
Left message informing CT was orderred by dr. Gem chaudhari and that patient can call back either us or them to schedule CT test.

## 2024-12-12 ENCOUNTER — TELEPHONE (OUTPATIENT)
Dept: NEUROLOGY | Facility: CLINIC | Age: 59
End: 2024-12-12
Payer: COMMERCIAL

## 2024-12-12 NOTE — TELEPHONE ENCOUNTER
Would the patient rather a call back or a response via [facility]? Call back   Best Call Back Number: [phone]  [id number] this is also the case number   Additional Information: they are trying to confirm if a medical form has been received it was sent by fax on 12/11/24

## 2024-12-16 ENCOUNTER — OFFICE VISIT (OUTPATIENT)
Dept: OPHTHALMOLOGY | Facility: CLINIC | Age: 59
End: 2024-12-16
Payer: COMMERCIAL

## 2024-12-16 DIAGNOSIS — G70.00 MYASTHENIA GRAVIS, ACHR ANTIBODY POSITIVE: Primary | ICD-10-CM

## 2024-12-16 DIAGNOSIS — H50.00 ESOTROPIA OF BOTH EYES: ICD-10-CM

## 2024-12-16 DIAGNOSIS — H02.401 PTOSIS OF RIGHT UPPER EYELID: ICD-10-CM

## 2024-12-16 PROCEDURE — 1159F MED LIST DOCD IN RCRD: CPT | Mod: CPTII,S$GLB,, | Performed by: STUDENT IN AN ORGANIZED HEALTH CARE EDUCATION/TRAINING PROGRAM

## 2024-12-16 PROCEDURE — 99213 OFFICE O/P EST LOW 20 MIN: CPT | Mod: S$GLB,,, | Performed by: STUDENT IN AN ORGANIZED HEALTH CARE EDUCATION/TRAINING PROGRAM

## 2024-12-16 PROCEDURE — 92060 SENSORIMOTOR EXAMINATION: CPT | Mod: S$GLB,,, | Performed by: STUDENT IN AN ORGANIZED HEALTH CARE EDUCATION/TRAINING PROGRAM

## 2024-12-16 PROCEDURE — 99999 PR PBB SHADOW E&M-EST. PATIENT-LVL III: CPT | Mod: PBBFAC,,, | Performed by: STUDENT IN AN ORGANIZED HEALTH CARE EDUCATION/TRAINING PROGRAM

## 2024-12-16 PROCEDURE — 3044F HG A1C LEVEL LT 7.0%: CPT | Mod: CPTII,S$GLB,, | Performed by: STUDENT IN AN ORGANIZED HEALTH CARE EDUCATION/TRAINING PROGRAM

## 2024-12-16 PROCEDURE — 1160F RVW MEDS BY RX/DR IN RCRD: CPT | Mod: CPTII,S$GLB,, | Performed by: STUDENT IN AN ORGANIZED HEALTH CARE EDUCATION/TRAINING PROGRAM

## 2024-12-16 NOTE — PROGRESS NOTES
HPI    DLS: 09/16/2024   Candelario Amaral is a 59 y.o. male who returns for 3 month f/u. Started using   prednisone in November, states that during three days of not using, eye   symptoms improved. He states that he has intermittent double vision, RUL   droopiness and blurry vision. He has been wearing an eye patch for two   months on as needed basisto help relieve double vision.     No Current Eye Meds.      Last edited by Martell Luna MD on 12/16/2024  4:34 PM.        ROS    Negative for: Constitutional, Gastrointestinal, Neurological, Skin,   Genitourinary, Musculoskeletal, HENT, Endocrine, Cardiovascular, Eyes,   Respiratory, Psychiatric, Allergic/Imm, Heme/Lymph  Last edited by Martell Luna MD on 12/16/2024  4:34 PM.        Assessment /Plan     For exam results, see Encounter Report.    Myasthenia gravis, AChR antibody positive    Esotropia of both eyes    Ptosis of right upper eyelid        Problem List Items Addressed This Visit          Neuro    Myasthenia gravis, AChR antibody positive - Primary    Current Assessment & Plan     Patient woke up one day in early August with new onset double vision   MRI Brain 9/10/24: unremarkable     9/16/24: Patient reports fluctuation of symptoms during the day during the day  On exam, has variable RUL ptosis with + ice pack test ;  EOM testing shows mild abduction deficit of left eye  and and esophoria that buils to an esotropia in left gaze only     9/3/24: positive AChR Binding Ab    Seen with Dr. Rabago (Neurology): tried Mestinon but did not help symptoms  11/8/24 Seen with  Dr. Bennett (Neuromuscular): prescribed steroids    12/16/24: eye alignment measurements improved today  Of note, patient insistent his symptoms of eyelid droop, double vision, etc. IMPROVED when off steroids for 3 days last week    Plan:  Continue steroids as prescribed - unclear reason for improved symptoms off but will message Dr. Bennett  Can patch on PRN basis  Explained will await at  least 6 months of stable alignment prior to any intervention (prisms or eye muscle surgery)  RTC 3 months            Ophtho    Esotropia of both eyes    Ptosis of right upper eyelid        Martell Luna MD  Pediatric Ophthalmology and Adult Strabismus  [facility]

## 2024-12-16 NOTE — ASSESSMENT & PLAN NOTE
Patient woke up one day in early August with new onset double vision   MRI Brain 9/10/24: unremarkable     9/16/24: Patient reports fluctuation of symptoms during the day during the day  On exam, has variable RUL ptosis with + ice pack test ;  EOM testing shows mild abduction deficit of left eye  and and esophoria that buils to an esotropia in left gaze only     9/3/24: positive AChR Binding Ab    Seen with Dr. Rabago (Neurology): tried Mestinon but did not help symptoms  11/8/24 Seen with  Dr. Bennett (Neuromuscular): prescribed steroids    12/16/24: eye alignment measurements improved today  Of note, patient insistent his symptoms of eyelid droop, double vision, etc. IMPROVED when off steroids for 3 days last week    Plan:  Continue steroids as prescribed - unclear reason for improved symptoms off but will message Dr. Bennett  Can patch on PRN basis  Explained will await at least 6 months of stable alignment prior to any intervention (prisms or eye muscle surgery)  RTC 3 months

## 2025-01-21 ENCOUNTER — TELEPHONE (OUTPATIENT)
Facility: CLINIC | Age: 60
End: 2025-01-21
Payer: COMMERCIAL

## 2025-01-22 ENCOUNTER — TELEPHONE (OUTPATIENT)
Facility: CLINIC | Age: 60
End: 2025-01-22
Payer: COMMERCIAL

## 2025-01-22 NOTE — TELEPHONE ENCOUNTER
Left a message for patient informing they have been scheduled an appointment on jan 22 at 10am.  And asked to call back to reschedule if that day/time does not work for them

## 2025-01-23 ENCOUNTER — TELEPHONE (OUTPATIENT)
Facility: CLINIC | Age: 60
End: 2025-01-23

## 2025-01-23 ENCOUNTER — OFFICE VISIT (OUTPATIENT)
Facility: CLINIC | Age: 60
End: 2025-01-23
Payer: COMMERCIAL

## 2025-01-23 DIAGNOSIS — G70.00 MYASTHENIA GRAVIS, ACHR ANTIBODY POSITIVE: ICD-10-CM

## 2025-01-23 PROCEDURE — 98005 SYNCH AUDIO-VIDEO EST LOW 20: CPT | Mod: 93,,, | Performed by: PSYCHIATRY & NEUROLOGY

## 2025-01-23 RX ORDER — PREDNISONE 5 MG/1
10 TABLET ORAL DAILY
Qty: 62 TABLET | Refills: 1 | Status: SHIPPED | OUTPATIENT
Start: 2025-01-23

## 2025-01-23 RX ORDER — PYRIDOSTIGMINE BROMIDE 60 MG/1
30 TABLET ORAL EVERY 6 HOURS
Qty: 62 TABLET | Refills: 1 | Status: SHIPPED | OUTPATIENT
Start: 2025-01-23

## 2025-01-23 NOTE — TELEPHONE ENCOUNTER
----- Message from Marley Bennett MD sent at 1/23/2025 12:46 PM CST -----  Good morning, patient needs a letter for filing his disability paperwork.  I have added fax number and the questions which needs to be answered on the support letter in my recent note.  Please let me know if any questions.  Thank you

## 2025-01-23 NOTE — PROGRESS NOTES
Audio Only Telehealth Visit     The patient location is: Home ([place])  The chief complaint leading to consultation is: Disability denial  Visit type: Virtual visit with audio only (telephone)  Total time spent in medical discussion with patient: 15 minutes  Total time spent on date of the encounter:25 minutes    The reason for the audio only service rather than synchronous audio and video virtual visit was related to technical difficulties or patient preference/necessity.     Each patient to whom I provide medical services by telemedicine is:  (1) informed of the relationship between the physician and patient and the respective role of any other health care provider with respect to management of the patient; and (2) notified that they may decline to receive medical services by telemedicine and may withdraw from such care at any time. Patient verbally consented to receive this service via voice-only telephone call.    HPI:    Since last visit, patient has noted improvement in his symptoms with ocular myasthenia gravis.  He still has mild blurry vision but no double vision.  There is no more droopy eyes.  He has noticed no difficulty with swallowing and chewing of food.  There is no difficulty more weakness in extremities.    He is currently on:  Prednisone 20mg for 7 days taper down from 60mg.  Mestinon none    Myasthenia Gravis Activities of Daily Living Scale     Grade   Function 0 1 2 3   Double Vision None Occasional, not every day Daily, but not constant Constant     1     Eyelid Droop None Occasional, not every day Daily, but not constant Constant    0      Talking Normal Intermittent slurring or nasal speech Constant slurring or nasal speech, but can be understood Speech difficult to understand    0      Chewing Normal Fatigues with solid food Fatigues with soft food Gastric tube    0      Swallowing Normal Chokes rarely Frequent choking requiring change of diet Gastric tube    0      Breathing Normal  Shortness of breath on exertion Shortness of breath at rest Ventilator    0      Brushing teeth or hair Normal Requires extra effort but requires no rest period Rest periods needed Cannot do one or more of these functions    0      Ability to rise from chair or toilet Normal Sometimes uses arms Always uses arms Requires assistance    0      Total MG ADL Score 1     Fax # [phone], Statement of Health Unit    Diagnosis:  Acetylcholine receptor antibody positive Ocular myasthenia gravis  Severity of condition:  Mild but controlled  Underlying cause:  Antibody positive autoimmune condition  Type of treatment:  Steroid treatment.  Current Status:  Clinically stable with mild symptoms    Assessment and plan:    59-year-old male with antibody positive ocular myasthenia gravis for follow-up with good improvement.  This time he noticed improvement with steroids and his current dose is 20 mg.  He is happy with his current management and he has filed for disability in the meantime for which he needs support letter from the office which will be provided.  I started him on low dose of Mestinon and his dose of steroids will be tapered after 4 weeks when he will follow-up in person in clinic.     This service was not originating from a related E/M service provided within the previous 7 days nor will  to an E/M service or procedure within the next 24 hours or my soonest available appointment.  Prevailing standard of care was able to be met in this audio-only visit.

## 2025-01-30 ENCOUNTER — TELEPHONE (OUTPATIENT)
Facility: CLINIC | Age: 60
End: 2025-01-30
Payer: COMMERCIAL

## 2025-01-30 NOTE — TELEPHONE ENCOUNTER
Spoke to patient to schedule appointment (FU) on feb 27 at 8:30am. Patient verbalized agreement

## 2025-02-27 ENCOUNTER — OFFICE VISIT (OUTPATIENT)
Facility: CLINIC | Age: 60
End: 2025-02-27
Payer: COMMERCIAL

## 2025-02-27 VITALS
BODY MASS INDEX: 35.9 KG/M2 | DIASTOLIC BLOOD PRESSURE: 96 MMHG | SYSTOLIC BLOOD PRESSURE: 164 MMHG | HEART RATE: 92 BPM | HEIGHT: 69 IN | WEIGHT: 242.38 LBS

## 2025-02-27 DIAGNOSIS — G70.00 MYASTHENIA GRAVIS, ACHR ANTIBODY POSITIVE: Primary | ICD-10-CM

## 2025-02-27 PROCEDURE — 1159F MED LIST DOCD IN RCRD: CPT | Mod: CPTII,S$GLB,, | Performed by: PSYCHIATRY & NEUROLOGY

## 2025-02-27 PROCEDURE — 3077F SYST BP >= 140 MM HG: CPT | Mod: CPTII,S$GLB,, | Performed by: PSYCHIATRY & NEUROLOGY

## 2025-02-27 PROCEDURE — 3008F BODY MASS INDEX DOCD: CPT | Mod: CPTII,S$GLB,, | Performed by: PSYCHIATRY & NEUROLOGY

## 2025-02-27 PROCEDURE — 3080F DIAST BP >= 90 MM HG: CPT | Mod: CPTII,S$GLB,, | Performed by: PSYCHIATRY & NEUROLOGY

## 2025-02-27 PROCEDURE — G2211 COMPLEX E/M VISIT ADD ON: HCPCS | Mod: S$GLB,,, | Performed by: PSYCHIATRY & NEUROLOGY

## 2025-02-27 PROCEDURE — 99214 OFFICE O/P EST MOD 30 MIN: CPT | Mod: S$GLB,,, | Performed by: PSYCHIATRY & NEUROLOGY

## 2025-02-27 PROCEDURE — 99999 PR PBB SHADOW E&M-EST. PATIENT-LVL III: CPT | Mod: PBBFAC,,, | Performed by: PSYCHIATRY & NEUROLOGY

## 2025-02-27 RX ORDER — LYSINE HCL 500 MG
1 TABLET ORAL ONCE
Qty: 1 TABLET | Refills: 0 | Status: SHIPPED | OUTPATIENT
Start: 2025-02-27 | End: 2025-02-27

## 2025-02-27 RX ORDER — OMEPRAZOLE 40 MG/1
40 CAPSULE, DELAYED RELEASE ORAL DAILY
Qty: 31 EACH | Refills: 11 | Status: SHIPPED | OUTPATIENT
Start: 2025-02-27 | End: 2026-02-27

## 2025-02-27 RX ORDER — PREDNISONE 5 MG/1
TABLET ORAL
Qty: 200 TABLET | Refills: 1 | Status: SHIPPED | OUTPATIENT
Start: 2025-02-27

## 2025-02-27 NOTE — PATIENT INSTRUCTIONS
Walk and exercise    Reduce Prednisone 2.5 mg every weeks, contact in case of any worsening.    In case of any question, plz contact through [facility].

## 2025-02-27 NOTE — PROGRESS NOTES
[facility] - NEUROLOGY [facility]TH [facility], [place]    Date: 2/27/25  Patient Name: Candelario Amaral   MRN: [id number]   Referring Provider: No ref. provider found    Thank you so much No ref. provider found for your patient referral to Neuromuscular team at [facility]. We take pride in our care coordination and look forward to your feedback and questions.    Assessment:   59-year-old male with antibody positive ocular myasthenia gravis with the improvement on current management for follow-up.  I discussed about prognosis of ocular myasthenia gravis with progression to generalized myasthenia gravis.  I provided him with a list of medications which can be potentially used in myasthenia gravis as steroid sparing medication.      2/3 patients experience weakness in ocular muscles 1st.  Up to 75% patients with ocular myasthenia gravis develop generalized weakness with in 2-3 years. (Joanne D, Brunner N, Scout T, Gregory QUIROS. Lifetime course of myasthenia gravis.Muscle Nerve 2008; 37(2):141-149. doi:10.1002/mus.97359)    I discussed about myasthenia gravis precautions and need for healthy lifestyle. I addressed his complaints. I provided information about fall precautions and healthy lifestyle. I would wish him very best for improvement/recovery in his condition.    Follow up in clinic in 6 months.    Future direction based on feedback:    Plan:     Problem List Items Addressed This Visit          Neuro    Myasthenia gravis, AChR antibody positive - Primary    Relevant Medications    predniSONE (DELTASONE) 5 MG tablet    omeprazole (PRILOSEC) 40 MG capsule    calcium carbonate-vit D3-min 600 mg-10 mcg (400 unit) Tab       Marley Bennett MD    This evaluation was completed in >30  Minutes over 50% of the time spent on education & counseling. This includes face to face time and non-face to face time preparing to see the patient (eg, review of tests), obtaining and/or reviewing separately obtained history,  documenting clinical information in the electronic or other health record, independently interpreting results and communicating results to the patient/family/caregiver, or care coordinator.    Visit today is associated with current or anticipated ongoing medical care related to this patient's single serious condition/complex condition (myasthenia gravis). Follow up: 6  months    Patient note was created using MModal Dictation.  Any errors in syntax or even information may not have been identified and edited on initial review prior to signing this note.    Details provided by:    Patient  Family- Wife (Alis Amaral)    Reason for visit:  Myasthenia gravis for evaluation.      Interval history on 2/27/25   Since last visit, he has noticed improvement in his double vision and eyelid droop.  Currently he is on prednisone 20 mg daily, Mestinon 60 mg.  I guided him about steroid spreading medications and provided him with a list.  He will try to get down on the dose of prednisone by 2.5 mg every 4 week and in case of worsening of symptoms I will start him on oral immunosuppressive therapy.    On examination, extraocular movements intact no diplopia.  Good neck flexion/extension strength and puffing of cheeks.  No fatigability in upper and lower extremities.  He will complete the CT scan of the chest as part of the workup.    Myasthenia Gravis Activities of Daily Living Scale     Grade   Function 0 1 2 3   Double Vision None Occasional, not every day Daily, but not constant Constant    0      Eyelid Droop None Occasional, not every day Daily, but not constant Constant    0      Talking Normal Intermittent slurring or nasal speech Constant slurring or nasal speech, but can be understood Speech difficult to understand    0      Chewing Normal Fatigues with solid food Fatigues with soft food Gastric tube    0      Swallowing Normal Chokes rarely Frequent choking requiring change of diet Gastric tube    0      Breathing  Normal Shortness of breath on exertion Shortness of breath at rest Ventilator    0      Brushing teeth or hair Normal Requires extra effort but requires no rest period Rest periods needed Cannot do one or more of these functions    0      Ability to rise from chair or toilet Normal Sometimes uses arms Always uses arms Requires assistance    0      Total MG ADL Score 0       Interval work up:      ==============================================================  Audio Only Telehealth Visit on 01/23/2025   Since last visit, patient has noted improvement in his symptoms with ocular myasthenia gravis.  He still has mild blurry vision but no double vision.  There is no more droopy eyes.  He has noticed no difficulty with swallowing and chewing of food.  There is no difficulty more weakness in extremities.     He is currently on:  Prednisone 20mg for 7 days taper down from 60mg.  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  Current Status:  Clinically stable with mild symptoms     Assessment and plan:    59-year-old male with antibody positive ocular myasthenia gravis for follow-up with good improvement.  This time he noticed improvement with steroids and his current dose is 20 mg.  He is happy with his current management and he has filed for disability in the meantime for which he needs support letter from the office which will be provided.  I started him on low dose of Mestinon and his dose of steroids will be tapered after 4 weeks when he will follow-up in person in clinic.    =========================================================================================  Initial encounter on 11/08/2024  HISTORY OF PRESENT  ILLNESS   The patient is a 59-year-old male presenting for antibody-positive myasthenia gravis. He is accompanied by his wife.    He began experiencing symptoms on 07/28/2024, following a 37-hour drive from [place] to [place] with his son. The day after the drive, he noticed his right eye drooping and began experiencing double and triple vision. Initially, he attributed these symptoms to an allergy, as his son had been in contact with a dog. He was referred to the emergency room for an MRI or CAT scan.     He was prescribed Mestinon 60 mg twice daily for 10 days, but this did not alleviate his symptoms.  He was tried on Mestinon and the dose was increased to 90 mg 3 times a day but would was prove ineffective as well.  He discontinued the medication last week.  He is book for his CT scan of the chest for thymic tissue.    He reports no pain, difficulty swallowing, breathing issues, or difficulty navigating stairs. He also reports no bladder control issues or other medical conditions. His double vision is intermittent, occurring during the day, while his eye drooping seemingly constant. He reports no slurred speech, fatigue while chewing food, or difficulty brushing his teeth. He does not need to push on armrests to rise from a chair.    He has not consumed alcohol for the past 2 months while on medication but has resumed drinking since stopping the medication. He snores when tired.  Wife feels that she drinks alcohol too much.    SOCIAL HISTORY  He is a retired . He smokes and drinks alcohol. He dips tobacco.    FAMILY HISTORY  He denies any family history of neuromuscular condition.    Myasthenia Gravis Activities of Daily Living Scale =4    Pertinent work up based on chart review for current condition:  Acetylcholine receptor binding (5.51) and modulating antibody positive   HIV 1 and 2 antibody negative   Lyme serology negative   Hepatitis-C negative   TSH 1.2   Comprehensive metabolic panel normal  "  CBC with MCV 88     MRI brain and orbit on 08/20/2024.    Normal    Review of Systems:  12 system review of systems is negative except for the symptoms mentioned in HPI.     PHYSICAL EXAMINATION     Vitals:    02/27/25 0840   BP: (!) 164/96   BP Location: Left arm   Patient Position: Sitting   Pulse: 92   Weight: 109.9 kg (242 lb 6.3 oz)   Height: 5' 9" (1.753 m)       Body mass index is 35.8 kg/m².     GENERAL/CONSTITUTIONAL/SYSTEMIC:    -Well appearing; well nourished  -obese   -Herrera Palate-2    HIGHER INTEGRATIVE FUNCTIONS:   -Attention & concentration: Normal   -Orientation: Oriented to person, place & time  -Memory: Normal  -Language: Normal   -Fund of Knowledge: Normal     CRANIAL NERVES:   -CN 2: Visual fields full  -CN 2,3: PERRL, mild right eye ptosis not covering pupil  -CN 3,4,6: EOMI with mild diplopia which improves by covering 1 eye especially while looking up.  -CN 5: Facial sensation intact bilaterally  -CN 7: Facial strength/movement intact bilaterally, over activation of right frontalis  -CN 8: Hearing normal bilaterally  -CN 9,10: Palate elevates symmetrically  -CN 11: Normal shoulder shrug and head turn, no neck flexion/extension weakness  -CN 12: Tongue protrudes midline     MOTOR:   -Tone: normal in upper and lower extremities  -UE/LE motor: 5/5 throughout, no pronator drift  -no fatigability on repetitive testing     SENSATION:   -Intact bilaterally to Vibration and pin prick    REFLEXES:   -2/4 upper and lower extremities bilaterally  -Flexor plantar reflex bilaterally    COORDINATION:   -FNF normal bilaterally    GAIT:   -Normal casual  gait. Able to stand on heels and toes without difficulty.  -no difficulty in squatting    Scheduled Follow-up :  Future Appointments   Date Time Provider Department Center   3/17/2025 10:00 AM Martell Luna MD [facility]       After Visit Medication List :     Medication List            Accurate as of February 27, 2025  8:46 AM. If " you have any questions, ask your nurse or doctor.                CHANGE how you take these medications      predniSONE 5 MG tablet  Commonly known as: DELTASONE  Take 2 tablets (10 mg total) by mouth once daily.  What changed: how much to take            CONTINUE taking these medications      calcium carbonate-vit D3-min 600 mg-10 mcg (400 unit) Tab  Take 1 tablet by mouth once. for 1 dose     mecobalamin (vitamin B12) 1,000 mcg Chew  Commonly known as: B12 ACTIVE  Take 1,000 mcg by mouth once daily.     multivitamin per tablet  Commonly known as: THERAGRAN     omeprazole 40 MG capsule  Commonly known as: PRILOSEC  Take 1 capsule (40 mg total) by mouth once daily.     pyridostigmine 60 mg Tab  Commonly known as: MESTINON  Take 0.5 tablets (30 mg total) by mouth every 6 (six) hours.              Signing Physician:          Marley Bennett MD  , [facility] [employer] [facility] [employer]  Section Head Neuromuscular Medicine. [facility].   [facility]. 7th [place] [address].    This note was generated with the assistance of ambient listening technology. Verbal consent was obtained by the patient and accompanying visitor(s) for the recording of patient appointment to facilitate this note. I attest to having reviewed and edited the generated note for accuracy, though some syntax or spelling errors may persist. Please contact the author of this note for any clarification.

## 2025-03-06 ENCOUNTER — HOSPITAL ENCOUNTER (OUTPATIENT)
Dept: RADIOLOGY | Facility: HOSPITAL | Age: 60
Discharge: HOME | End: 2025-03-06
Attending: STUDENT IN AN ORGANIZED HEALTH CARE EDUCATION/TRAINING PROGRAM
Payer: COMMERCIAL

## 2025-03-06 DIAGNOSIS — G70.00 MYASTHENIA GRAVIS, ACHR ANTIBODY POSITIVE: ICD-10-CM

## 2025-03-06 PROCEDURE — 71250 CT THORAX DX C-: CPT | Mod: TC

## 2025-03-06 PROCEDURE — 71250 CT THORAX DX C-: CPT | Mod: 26,,, | Performed by: RADIOLOGY

## 2025-03-08 DIAGNOSIS — G70.00 MYASTHENIA GRAVIS, ACHR ANTIBODY POSITIVE: ICD-10-CM

## 2025-03-10 RX ORDER — PREDNISONE 5 MG/1
10 TABLET ORAL
Qty: 62 TABLET | Refills: 0 | Status: SHIPPED | OUTPATIENT
Start: 2025-03-10

## 2025-03-13 DIAGNOSIS — G70.00 MYASTHENIA GRAVIS, ACHR ANTIBODY POSITIVE: ICD-10-CM

## 2025-03-19 ENCOUNTER — TELEPHONE (OUTPATIENT)
Facility: CLINIC | Age: 60
End: 2025-03-19
Payer: COMMERCIAL

## 2025-03-19 ENCOUNTER — PATIENT MESSAGE (OUTPATIENT)
Facility: CLINIC | Age: 60
End: 2025-03-19
Payer: COMMERCIAL

## 2025-03-19 RX ORDER — PREDNISONE 5 MG/1
10 TABLET ORAL
Qty: 62 TABLET | Refills: 0 | Status: SHIPPED | OUTPATIENT
Start: 2025-03-19

## 2025-04-21 DIAGNOSIS — G70.00 MYASTHENIA GRAVIS, ACHR ANTIBODY POSITIVE: ICD-10-CM

## 2025-04-22 RX ORDER — PREDNISONE 5 MG/1
20 TABLET ORAL DAILY
Qty: 62 TABLET | Refills: 0 | Status: SHIPPED | OUTPATIENT
Start: 2025-04-22

## 2025-06-06 ENCOUNTER — TELEPHONE (OUTPATIENT)
Facility: CLINIC | Age: 60
End: 2025-06-06
Payer: COMMERCIAL

## 2025-07-25 ENCOUNTER — TELEPHONE (OUTPATIENT)
Facility: CLINIC | Age: 60
End: 2025-07-25
Payer: COMMERCIAL

## 2025-07-25 ENCOUNTER — PATIENT MESSAGE (OUTPATIENT)
Facility: CLINIC | Age: 60
End: 2025-07-25
Payer: COMMERCIAL

## 2025-07-25 NOTE — TELEPHONE ENCOUNTER
Left a message for patient informing they have been scheduled an appointment for virtual on 8/22 @ 9am.  And asked to call back to reschedule if that day/time does not work for them

## 2025-08-25 ENCOUNTER — TELEPHONE (OUTPATIENT)
Facility: CLINIC | Age: 60
End: 2025-08-25
Payer: COMMERCIAL

## 2025-09-05 ENCOUNTER — OFFICE VISIT (OUTPATIENT)
Facility: CLINIC | Age: 60
End: 2025-09-05
Payer: COMMERCIAL

## 2025-09-05 VITALS
BODY MASS INDEX: 34.99 KG/M2 | HEIGHT: 69 IN | WEIGHT: 236.25 LBS | SYSTOLIC BLOOD PRESSURE: 171 MMHG | DIASTOLIC BLOOD PRESSURE: 105 MMHG | HEART RATE: 71 BPM

## 2025-09-05 DIAGNOSIS — H53.2 DIPLOPIA: ICD-10-CM

## 2025-09-05 DIAGNOSIS — G70.00 MYASTHENIA GRAVIS, ACHR ANTIBODY POSITIVE: Primary | ICD-10-CM

## 2025-09-05 DIAGNOSIS — Z78.9 ALCOHOL CONSUMPTION HEAVY: ICD-10-CM

## 2025-09-05 PROCEDURE — 99999 PR PBB SHADOW E&M-EST. PATIENT-LVL III: CPT | Mod: PBBFAC,,, | Performed by: PSYCHIATRY & NEUROLOGY

## 2025-09-05 RX ORDER — MYCOPHENOLATE MOFETIL 250 MG/1
250 CAPSULE ORAL 2 TIMES DAILY
Qty: 60 CAPSULE | Refills: 11 | Status: SHIPPED | OUTPATIENT
Start: 2025-09-05 | End: 2026-09-05